# Patient Record
Sex: MALE | Race: WHITE | Employment: OTHER | URBAN - METROPOLITAN AREA
[De-identification: names, ages, dates, MRNs, and addresses within clinical notes are randomized per-mention and may not be internally consistent; named-entity substitution may affect disease eponyms.]

---

## 2017-01-10 ENCOUNTER — OFFICE VISIT - HEALTHEAST (OUTPATIENT)
Dept: INTERNAL MEDICINE | Facility: CLINIC | Age: 62
End: 2017-01-10

## 2017-01-10 DIAGNOSIS — E11.9 DIABETES (H): ICD-10-CM

## 2017-01-10 DIAGNOSIS — Z00.00 ROUTINE GENERAL MEDICAL EXAMINATION AT A HEALTH CARE FACILITY: ICD-10-CM

## 2017-01-10 DIAGNOSIS — N40.0 BPH (BENIGN PROSTATIC HYPERPLASIA): ICD-10-CM

## 2017-01-10 DIAGNOSIS — I10 ESSENTIAL HYPERTENSION: ICD-10-CM

## 2017-01-10 DIAGNOSIS — I10 UNSPECIFIED ESSENTIAL HYPERTENSION: ICD-10-CM

## 2017-01-10 DIAGNOSIS — E78.5 HYPERLIPIDEMIA: ICD-10-CM

## 2017-01-10 DIAGNOSIS — Z23 NEED FOR SHINGLES VACCINE: ICD-10-CM

## 2017-01-10 DIAGNOSIS — F52.8 PSYCHOSEXUAL DYSFUNCTION WITH INHIBITED SEXUAL EXCITEMENT: ICD-10-CM

## 2017-01-10 DIAGNOSIS — D12.6 BENIGN NEOPLASM OF COLON: ICD-10-CM

## 2017-01-10 LAB
CHOLEST SERPL-MCNC: 249 MG/DL
FASTING STATUS PATIENT QL REPORTED: YES
HDLC SERPL-MCNC: 30 MG/DL
LDLC SERPL CALC-MCNC: 187 MG/DL
TRIGL SERPL-MCNC: 160 MG/DL

## 2017-01-10 ASSESSMENT — MIFFLIN-ST. JEOR: SCORE: 1592.63

## 2017-01-11 ENCOUNTER — AMBULATORY - HEALTHEAST (OUTPATIENT)
Dept: INTERNAL MEDICINE | Facility: CLINIC | Age: 62
End: 2017-01-11

## 2017-01-11 ENCOUNTER — COMMUNICATION - HEALTHEAST (OUTPATIENT)
Dept: INTERNAL MEDICINE | Facility: CLINIC | Age: 62
End: 2017-01-11

## 2017-01-11 DIAGNOSIS — E11.9 DIABETES (H): ICD-10-CM

## 2017-01-11 LAB — HBA1C MFR BLD: 7.1 % (ref 3.5–6)

## 2017-02-28 ENCOUNTER — COMMUNICATION - HEALTHEAST (OUTPATIENT)
Dept: INTERNAL MEDICINE | Facility: CLINIC | Age: 62
End: 2017-02-28

## 2017-04-21 ENCOUNTER — RECORDS - HEALTHEAST (OUTPATIENT)
Dept: ADMINISTRATIVE | Facility: OTHER | Age: 62
End: 2017-04-21

## 2017-05-24 ENCOUNTER — COMMUNICATION - HEALTHEAST (OUTPATIENT)
Dept: INTERNAL MEDICINE | Facility: CLINIC | Age: 62
End: 2017-05-24

## 2017-11-28 ENCOUNTER — RECORDS - HEALTHEAST (OUTPATIENT)
Dept: ADMINISTRATIVE | Facility: OTHER | Age: 62
End: 2017-11-28

## 2018-01-10 ENCOUNTER — COMMUNICATION - HEALTHEAST (OUTPATIENT)
Dept: INTERNAL MEDICINE | Facility: CLINIC | Age: 63
End: 2018-01-10

## 2018-01-11 ENCOUNTER — OFFICE VISIT - HEALTHEAST (OUTPATIENT)
Dept: INTERNAL MEDICINE | Facility: CLINIC | Age: 63
End: 2018-01-11

## 2018-01-11 DIAGNOSIS — Z23 NEED FOR PROPHYLACTIC VACCINATION WITH TETANUS-DIPHTHERIA (TD): ICD-10-CM

## 2018-01-11 DIAGNOSIS — E78.5 HYPERLIPIDEMIA: ICD-10-CM

## 2018-01-11 DIAGNOSIS — Z00.00 ROUTINE GENERAL MEDICAL EXAMINATION AT A HEALTH CARE FACILITY: ICD-10-CM

## 2018-01-11 DIAGNOSIS — I10 ESSENTIAL HYPERTENSION: ICD-10-CM

## 2018-01-11 DIAGNOSIS — Z23 NEED FOR VACCINATION FOR STREP PNEUMONIAE: ICD-10-CM

## 2018-01-11 DIAGNOSIS — K29.70 GASTRITIS: ICD-10-CM

## 2018-01-11 DIAGNOSIS — R73.03 PREDIABETES: ICD-10-CM

## 2018-01-11 DIAGNOSIS — N40.1 BENIGN PROSTATIC HYPERPLASIA WITH LOWER URINARY TRACT SYMPTOMS, SYMPTOM DETAILS UNSPECIFIED: ICD-10-CM

## 2018-01-11 LAB
ALBUMIN SERPL-MCNC: 3.8 G/DL (ref 3.5–5)
ALP SERPL-CCNC: 74 U/L (ref 45–120)
ALT SERPL W P-5'-P-CCNC: 48 U/L (ref 0–45)
ANION GAP SERPL CALCULATED.3IONS-SCNC: 7 MMOL/L (ref 5–18)
AST SERPL W P-5'-P-CCNC: 32 U/L (ref 0–40)
BILIRUB DIRECT SERPL-MCNC: 0.3 MG/DL
BILIRUB SERPL-MCNC: 0.9 MG/DL (ref 0–1)
BUN SERPL-MCNC: 13 MG/DL (ref 8–22)
CALCIUM SERPL-MCNC: 9 MG/DL (ref 8.5–10.5)
CHLORIDE BLD-SCNC: 108 MMOL/L (ref 98–107)
CHOLEST SERPL-MCNC: 168 MG/DL
CO2 SERPL-SCNC: 24 MMOL/L (ref 22–31)
CREAT SERPL-MCNC: 0.83 MG/DL (ref 0.7–1.3)
CREAT UR-MCNC: 169.6 MG/DL
ERYTHROCYTE [DISTWIDTH] IN BLOOD BY AUTOMATED COUNT: 11.1 % (ref 11–14.5)
FASTING STATUS PATIENT QL REPORTED: YES
GFR SERPL CREATININE-BSD FRML MDRD: >60 ML/MIN/1.73M2
GLUCOSE BLD-MCNC: 174 MG/DL (ref 70–125)
HBA1C MFR BLD: 7.8 % (ref 3.5–6)
HCT VFR BLD AUTO: 48.2 % (ref 40–54)
HDLC SERPL-MCNC: 32 MG/DL
HGB BLD-MCNC: 16.1 G/DL (ref 14–18)
LDLC SERPL CALC-MCNC: 105 MG/DL
MCH RBC QN AUTO: 31.4 PG (ref 27–34)
MCHC RBC AUTO-ENTMCNC: 33.5 G/DL (ref 32–36)
MCV RBC AUTO: 94 FL (ref 80–100)
MICROALBUMIN UR-MCNC: 0.58 MG/DL (ref 0–1.99)
MICROALBUMIN/CREAT UR: 3.4 MG/G
PLATELET # BLD AUTO: 154 THOU/UL (ref 140–440)
PMV BLD AUTO: 8.5 FL (ref 7–10)
POTASSIUM BLD-SCNC: 3.9 MMOL/L (ref 3.5–5)
PROT SERPL-MCNC: 7.4 G/DL (ref 6–8)
PSA SERPL-MCNC: 1.6 NG/ML (ref 0–4.5)
RBC # BLD AUTO: 5.14 MILL/UL (ref 4.4–6.2)
SODIUM SERPL-SCNC: 139 MMOL/L (ref 136–145)
TRIGL SERPL-MCNC: 155 MG/DL
TSH SERPL DL<=0.005 MIU/L-ACNC: 0.99 UIU/ML (ref 0.3–5)
WBC: 5.3 THOU/UL (ref 4–11)

## 2018-01-11 ASSESSMENT — MIFFLIN-ST. JEOR: SCORE: 1602.27

## 2018-01-15 ENCOUNTER — COMMUNICATION - HEALTHEAST (OUTPATIENT)
Dept: INTERNAL MEDICINE | Facility: CLINIC | Age: 63
End: 2018-01-15

## 2018-01-16 ENCOUNTER — AMBULATORY - HEALTHEAST (OUTPATIENT)
Dept: INTERNAL MEDICINE | Facility: CLINIC | Age: 63
End: 2018-01-16

## 2018-01-16 DIAGNOSIS — E66.9 DIABETES MELLITUS TYPE 2 IN OBESE: ICD-10-CM

## 2018-01-16 DIAGNOSIS — E11.69 DIABETES MELLITUS TYPE 2 IN OBESE: ICD-10-CM

## 2018-02-07 ENCOUNTER — RECORDS - HEALTHEAST (OUTPATIENT)
Dept: ADMINISTRATIVE | Facility: OTHER | Age: 63
End: 2018-02-07

## 2018-02-20 ENCOUNTER — COMMUNICATION - HEALTHEAST (OUTPATIENT)
Dept: INTERNAL MEDICINE | Facility: CLINIC | Age: 63
End: 2018-02-20

## 2018-02-20 DIAGNOSIS — I10 ESSENTIAL HYPERTENSION: ICD-10-CM

## 2018-04-17 ENCOUNTER — OFFICE VISIT - HEALTHEAST (OUTPATIENT)
Dept: INTERNAL MEDICINE | Facility: CLINIC | Age: 63
End: 2018-04-17

## 2018-04-17 DIAGNOSIS — I10 ESSENTIAL HYPERTENSION: ICD-10-CM

## 2018-04-17 DIAGNOSIS — E78.5 HYPERLIPIDEMIA: ICD-10-CM

## 2018-04-17 DIAGNOSIS — E11.9 DIABETES MELLITUS, TYPE 2 (H): ICD-10-CM

## 2018-04-17 LAB
ANION GAP SERPL CALCULATED.3IONS-SCNC: 10 MMOL/L (ref 5–18)
BUN SERPL-MCNC: 14 MG/DL (ref 8–22)
CALCIUM SERPL-MCNC: 9.5 MG/DL (ref 8.5–10.5)
CHLORIDE BLD-SCNC: 105 MMOL/L (ref 98–107)
CHOLEST SERPL-MCNC: 225 MG/DL
CO2 SERPL-SCNC: 23 MMOL/L (ref 22–31)
CREAT SERPL-MCNC: 0.8 MG/DL (ref 0.7–1.3)
FASTING STATUS PATIENT QL REPORTED: YES
GFR SERPL CREATININE-BSD FRML MDRD: >60 ML/MIN/1.73M2
GLUCOSE BLD-MCNC: 116 MG/DL (ref 70–125)
HBA1C MFR BLD: 6.6 % (ref 3.5–6)
HDLC SERPL-MCNC: 31 MG/DL
LDLC SERPL CALC-MCNC: 156 MG/DL
POTASSIUM BLD-SCNC: 4.1 MMOL/L (ref 3.5–5)
SODIUM SERPL-SCNC: 138 MMOL/L (ref 136–145)
TRIGL SERPL-MCNC: 189 MG/DL

## 2018-04-17 ASSESSMENT — MIFFLIN-ST. JEOR: SCORE: 1565.98

## 2018-04-19 ENCOUNTER — COMMUNICATION - HEALTHEAST (OUTPATIENT)
Dept: INTERNAL MEDICINE | Facility: CLINIC | Age: 63
End: 2018-04-19

## 2018-06-04 ENCOUNTER — OFFICE VISIT - HEALTHEAST (OUTPATIENT)
Dept: INTERNAL MEDICINE | Facility: CLINIC | Age: 63
End: 2018-06-04

## 2018-06-04 DIAGNOSIS — Z01.818 PREOP EXAM FOR INTERNAL MEDICINE: ICD-10-CM

## 2018-06-04 DIAGNOSIS — I10 ESSENTIAL HYPERTENSION: ICD-10-CM

## 2018-06-04 DIAGNOSIS — E66.9 DIABETES MELLITUS TYPE 2 IN OBESE: ICD-10-CM

## 2018-06-04 DIAGNOSIS — E78.5 HYPERLIPIDEMIA: ICD-10-CM

## 2018-06-04 DIAGNOSIS — N40.0 BPH (BENIGN PROSTATIC HYPERPLASIA): ICD-10-CM

## 2018-06-04 DIAGNOSIS — E11.69 DIABETES MELLITUS TYPE 2 IN OBESE: ICD-10-CM

## 2018-06-04 LAB
ANION GAP SERPL CALCULATED.3IONS-SCNC: 11 MMOL/L (ref 5–18)
ATRIAL RATE - MUSE: 73 BPM
BUN SERPL-MCNC: 13 MG/DL (ref 8–22)
CALCIUM SERPL-MCNC: 10.3 MG/DL (ref 8.5–10.5)
CHLORIDE BLD-SCNC: 106 MMOL/L (ref 98–107)
CHOLEST SERPL-MCNC: 243 MG/DL
CO2 SERPL-SCNC: 23 MMOL/L (ref 22–31)
CREAT SERPL-MCNC: 0.9 MG/DL (ref 0.7–1.3)
DIASTOLIC BLOOD PRESSURE - MUSE: NORMAL MMHG
ERYTHROCYTE [DISTWIDTH] IN BLOOD BY AUTOMATED COUNT: 11.4 % (ref 11–14.5)
FASTING STATUS PATIENT QL REPORTED: ABNORMAL
GFR SERPL CREATININE-BSD FRML MDRD: >60 ML/MIN/1.73M2
GLUCOSE BLD-MCNC: 117 MG/DL (ref 70–125)
HBA1C MFR BLD: 6.4 % (ref 3.5–6)
HCT VFR BLD AUTO: 50.3 % (ref 40–54)
HDLC SERPL-MCNC: 34 MG/DL
HGB BLD-MCNC: 17 G/DL (ref 14–18)
INTERPRETATION ECG - MUSE: NORMAL
LDLC SERPL CALC-MCNC: 164 MG/DL
MCH RBC QN AUTO: 31.9 PG (ref 27–34)
MCHC RBC AUTO-ENTMCNC: 33.8 G/DL (ref 32–36)
MCV RBC AUTO: 94 FL (ref 80–100)
P AXIS - MUSE: 27 DEGREES
PLATELET # BLD AUTO: 197 THOU/UL (ref 140–440)
PMV BLD AUTO: 8.3 FL (ref 7–10)
POTASSIUM BLD-SCNC: 4.2 MMOL/L (ref 3.5–5)
PR INTERVAL - MUSE: 186 MS
QRS DURATION - MUSE: 104 MS
QT - MUSE: 392 MS
QTC - MUSE: 431 MS
R AXIS - MUSE: -29 DEGREES
RBC # BLD AUTO: 5.33 MILL/UL (ref 4.4–6.2)
SODIUM SERPL-SCNC: 140 MMOL/L (ref 136–145)
SYSTOLIC BLOOD PRESSURE - MUSE: NORMAL MMHG
T AXIS - MUSE: 13 DEGREES
TRIGL SERPL-MCNC: 226 MG/DL
VENTRICULAR RATE- MUSE: 73 BPM
WBC: 6.9 THOU/UL (ref 4–11)

## 2018-06-04 ASSESSMENT — MIFFLIN-ST. JEOR: SCORE: 1507.01

## 2018-06-05 ENCOUNTER — COMMUNICATION - HEALTHEAST (OUTPATIENT)
Dept: INTERNAL MEDICINE | Facility: CLINIC | Age: 63
End: 2018-06-05

## 2018-06-25 ASSESSMENT — MIFFLIN-ST. JEOR: SCORE: 1534.23

## 2018-06-28 ENCOUNTER — ANESTHESIA - HEALTHEAST (OUTPATIENT)
Dept: SURGERY | Facility: CLINIC | Age: 63
End: 2018-06-28

## 2018-06-28 ENCOUNTER — SURGERY - HEALTHEAST (OUTPATIENT)
Dept: SURGERY | Facility: CLINIC | Age: 63
End: 2018-06-28

## 2018-08-07 ENCOUNTER — RECORDS - HEALTHEAST (OUTPATIENT)
Dept: ADMINISTRATIVE | Facility: OTHER | Age: 63
End: 2018-08-07

## 2018-09-18 ENCOUNTER — OFFICE VISIT - HEALTHEAST (OUTPATIENT)
Dept: INTERNAL MEDICINE | Facility: CLINIC | Age: 63
End: 2018-09-18

## 2018-09-18 DIAGNOSIS — E78.5 HYPERLIPIDEMIA: ICD-10-CM

## 2018-09-18 DIAGNOSIS — I10 ESSENTIAL HYPERTENSION: ICD-10-CM

## 2018-09-18 DIAGNOSIS — E11.9 DIABETES MELLITUS, TYPE 2 (H): ICD-10-CM

## 2018-09-18 DIAGNOSIS — N40.1 BENIGN PROSTATIC HYPERPLASIA WITH LOWER URINARY TRACT SYMPTOMS, SYMPTOM DETAILS UNSPECIFIED: ICD-10-CM

## 2018-09-18 LAB
ALBUMIN SERPL-MCNC: 4.2 G/DL (ref 3.5–5)
ALP SERPL-CCNC: 61 U/L (ref 45–120)
ALT SERPL W P-5'-P-CCNC: 25 U/L (ref 0–45)
ANION GAP SERPL CALCULATED.3IONS-SCNC: 10 MMOL/L (ref 5–18)
AST SERPL W P-5'-P-CCNC: 18 U/L (ref 0–40)
BILIRUB DIRECT SERPL-MCNC: 0.3 MG/DL
BILIRUB SERPL-MCNC: 0.9 MG/DL (ref 0–1)
BUN SERPL-MCNC: 11 MG/DL (ref 8–22)
CALCIUM SERPL-MCNC: 9.8 MG/DL (ref 8.5–10.5)
CHLORIDE BLD-SCNC: 106 MMOL/L (ref 98–107)
CHOLEST SERPL-MCNC: 154 MG/DL
CO2 SERPL-SCNC: 24 MMOL/L (ref 22–31)
CREAT SERPL-MCNC: 0.8 MG/DL (ref 0.7–1.3)
ERYTHROCYTE [DISTWIDTH] IN BLOOD BY AUTOMATED COUNT: 11.5 % (ref 11–14.5)
FASTING STATUS PATIENT QL REPORTED: YES
GFR SERPL CREATININE-BSD FRML MDRD: >60 ML/MIN/1.73M2
GLUCOSE BLD-MCNC: 126 MG/DL (ref 70–125)
HBA1C MFR BLD: 6.3 % (ref 3.5–6)
HCT VFR BLD AUTO: 48.7 % (ref 40–54)
HDLC SERPL-MCNC: 32 MG/DL
HGB BLD-MCNC: 16.4 G/DL (ref 14–18)
LDLC SERPL CALC-MCNC: 90 MG/DL
MCH RBC QN AUTO: 31.1 PG (ref 27–34)
MCHC RBC AUTO-ENTMCNC: 33.8 G/DL (ref 32–36)
MCV RBC AUTO: 92 FL (ref 80–100)
PLATELET # BLD AUTO: 201 THOU/UL (ref 140–440)
PMV BLD AUTO: 8.8 FL (ref 7–10)
POTASSIUM BLD-SCNC: 4.4 MMOL/L (ref 3.5–5)
PROT SERPL-MCNC: 8 G/DL (ref 6–8)
RBC # BLD AUTO: 5.29 MILL/UL (ref 4.4–6.2)
SODIUM SERPL-SCNC: 140 MMOL/L (ref 136–145)
TRIGL SERPL-MCNC: 161 MG/DL
WBC: 6.1 THOU/UL (ref 4–11)

## 2018-09-18 ASSESSMENT — MIFFLIN-ST. JEOR: SCORE: 1529.69

## 2018-09-19 ENCOUNTER — COMMUNICATION - HEALTHEAST (OUTPATIENT)
Dept: INTERNAL MEDICINE | Facility: CLINIC | Age: 63
End: 2018-09-19

## 2019-01-08 ENCOUNTER — COMMUNICATION - HEALTHEAST (OUTPATIENT)
Dept: INTERNAL MEDICINE | Facility: CLINIC | Age: 64
End: 2019-01-08

## 2019-01-08 DIAGNOSIS — I10 ESSENTIAL HYPERTENSION: ICD-10-CM

## 2019-01-08 DIAGNOSIS — E78.5 HYPERLIPIDEMIA: ICD-10-CM

## 2019-01-08 DIAGNOSIS — E11.9 DIABETES MELLITUS, TYPE 2 (H): ICD-10-CM

## 2019-04-17 ENCOUNTER — OFFICE VISIT - HEALTHEAST (OUTPATIENT)
Dept: INTERNAL MEDICINE | Facility: CLINIC | Age: 64
End: 2019-04-17

## 2019-04-17 DIAGNOSIS — F52.8 PSYCHOSEXUAL DYSFUNCTION WITH INHIBITED SEXUAL EXCITEMENT: ICD-10-CM

## 2019-04-17 DIAGNOSIS — Z00.00 ROUTINE GENERAL MEDICAL EXAMINATION AT A HEALTH CARE FACILITY: ICD-10-CM

## 2019-04-17 DIAGNOSIS — N40.0 BENIGN PROSTATIC HYPERPLASIA WITHOUT LOWER URINARY TRACT SYMPTOMS: ICD-10-CM

## 2019-04-17 DIAGNOSIS — Z23 NEED FOR PROPHYLACTIC VACCINATION AGAINST STREPTOCOCCUS PNEUMONIAE (PNEUMOCOCCUS) AND INFLUENZA: ICD-10-CM

## 2019-04-17 DIAGNOSIS — E78.2 MIXED HYPERLIPIDEMIA: ICD-10-CM

## 2019-04-17 DIAGNOSIS — E11.9 TYPE 2 DIABETES MELLITUS WITHOUT COMPLICATION, WITHOUT LONG-TERM CURRENT USE OF INSULIN (H): ICD-10-CM

## 2019-04-17 DIAGNOSIS — I10 ESSENTIAL HYPERTENSION: ICD-10-CM

## 2019-04-17 LAB
ALBUMIN SERPL-MCNC: 4.2 G/DL (ref 3.5–5)
ALBUMIN UR-MCNC: NEGATIVE MG/DL
ALP SERPL-CCNC: 63 U/L (ref 45–120)
ALT SERPL W P-5'-P-CCNC: 31 U/L (ref 0–45)
ANION GAP SERPL CALCULATED.3IONS-SCNC: 8 MMOL/L (ref 5–18)
APPEARANCE UR: CLEAR
AST SERPL W P-5'-P-CCNC: 23 U/L (ref 0–40)
BILIRUB DIRECT SERPL-MCNC: 0.2 MG/DL
BILIRUB SERPL-MCNC: 0.7 MG/DL (ref 0–1)
BILIRUB UR QL STRIP: NEGATIVE
BUN SERPL-MCNC: 13 MG/DL (ref 8–22)
CALCIUM SERPL-MCNC: 10 MG/DL (ref 8.5–10.5)
CHLORIDE BLD-SCNC: 106 MMOL/L (ref 98–107)
CHOLEST SERPL-MCNC: 167 MG/DL
CO2 SERPL-SCNC: 25 MMOL/L (ref 22–31)
COLOR UR AUTO: YELLOW
CREAT SERPL-MCNC: 0.84 MG/DL (ref 0.7–1.3)
CREAT UR-MCNC: 137.8 MG/DL
ERYTHROCYTE [DISTWIDTH] IN BLOOD BY AUTOMATED COUNT: 11.8 % (ref 11–14.5)
FASTING STATUS PATIENT QL REPORTED: YES
GFR SERPL CREATININE-BSD FRML MDRD: >60 ML/MIN/1.73M2
GLUCOSE BLD-MCNC: 121 MG/DL (ref 70–125)
GLUCOSE UR STRIP-MCNC: NEGATIVE MG/DL
HBA1C MFR BLD: 6.4 % (ref 3.5–6)
HCT VFR BLD AUTO: 48.8 % (ref 40–54)
HDLC SERPL-MCNC: 34 MG/DL
HGB BLD-MCNC: 17 G/DL (ref 14–18)
HGB UR QL STRIP: NEGATIVE
KETONES UR STRIP-MCNC: NEGATIVE MG/DL
LDLC SERPL CALC-MCNC: 88 MG/DL
LEUKOCYTE ESTERASE UR QL STRIP: NEGATIVE
MCH RBC QN AUTO: 31.6 PG (ref 27–34)
MCHC RBC AUTO-ENTMCNC: 34.8 G/DL (ref 32–36)
MCV RBC AUTO: 91 FL (ref 80–100)
MICROALBUMIN UR-MCNC: 1.44 MG/DL (ref 0–1.99)
MICROALBUMIN/CREAT UR: 10.4 MG/G
NITRATE UR QL: NEGATIVE
PH UR STRIP: 5.5 [PH] (ref 5–8)
PLATELET # BLD AUTO: 183 THOU/UL (ref 140–440)
PMV BLD AUTO: 8.3 FL (ref 7–10)
POTASSIUM BLD-SCNC: 4.2 MMOL/L (ref 3.5–5)
PROT SERPL-MCNC: 7.8 G/DL (ref 6–8)
RBC # BLD AUTO: 5.39 MILL/UL (ref 4.4–6.2)
SODIUM SERPL-SCNC: 139 MMOL/L (ref 136–145)
SP GR UR STRIP: 1.02 (ref 1–1.03)
TRIGL SERPL-MCNC: 226 MG/DL
TSH SERPL DL<=0.005 MIU/L-ACNC: 0.68 UIU/ML (ref 0.3–5)
UROBILINOGEN UR STRIP-ACNC: NORMAL
WBC: 6.3 THOU/UL (ref 4–11)

## 2019-04-17 ASSESSMENT — MIFFLIN-ST. JEOR: SCORE: 1561.44

## 2019-04-22 ENCOUNTER — COMMUNICATION - HEALTHEAST (OUTPATIENT)
Dept: INTERNAL MEDICINE | Facility: CLINIC | Age: 64
End: 2019-04-22

## 2019-05-17 ENCOUNTER — RECORDS - HEALTHEAST (OUTPATIENT)
Dept: ADMINISTRATIVE | Facility: OTHER | Age: 64
End: 2019-05-17

## 2019-05-24 ENCOUNTER — RECORDS - HEALTHEAST (OUTPATIENT)
Dept: HEALTH INFORMATION MANAGEMENT | Facility: CLINIC | Age: 64
End: 2019-05-24

## 2019-11-06 ENCOUNTER — AMBULATORY - HEALTHEAST (OUTPATIENT)
Dept: INTERNAL MEDICINE | Facility: CLINIC | Age: 64
End: 2019-11-06

## 2019-11-06 ENCOUNTER — COMMUNICATION - HEALTHEAST (OUTPATIENT)
Dept: LAB | Facility: CLINIC | Age: 64
End: 2019-11-06

## 2019-11-06 DIAGNOSIS — E78.2 MIXED HYPERLIPIDEMIA: ICD-10-CM

## 2019-11-06 DIAGNOSIS — E11.9 TYPE 2 DIABETES MELLITUS WITHOUT COMPLICATION, WITHOUT LONG-TERM CURRENT USE OF INSULIN (H): ICD-10-CM

## 2019-11-26 ENCOUNTER — AMBULATORY - HEALTHEAST (OUTPATIENT)
Dept: LAB | Facility: CLINIC | Age: 64
End: 2019-11-26

## 2019-11-26 DIAGNOSIS — E11.9 TYPE 2 DIABETES MELLITUS WITHOUT COMPLICATION, WITHOUT LONG-TERM CURRENT USE OF INSULIN (H): ICD-10-CM

## 2019-11-26 DIAGNOSIS — E78.2 MIXED HYPERLIPIDEMIA: ICD-10-CM

## 2019-11-26 LAB
ALBUMIN SERPL-MCNC: 4.2 G/DL (ref 3.5–5)
ALP SERPL-CCNC: 55 U/L (ref 45–120)
ALT SERPL W P-5'-P-CCNC: 36 U/L (ref 0–45)
ANION GAP SERPL CALCULATED.3IONS-SCNC: 13 MMOL/L (ref 5–18)
AST SERPL W P-5'-P-CCNC: 22 U/L (ref 0–40)
BILIRUB DIRECT SERPL-MCNC: 0.3 MG/DL
BILIRUB SERPL-MCNC: 0.9 MG/DL (ref 0–1)
BUN SERPL-MCNC: 15 MG/DL (ref 8–22)
CALCIUM SERPL-MCNC: 9.6 MG/DL (ref 8.5–10.5)
CHLORIDE BLD-SCNC: 108 MMOL/L (ref 98–107)
CHOLEST SERPL-MCNC: 156 MG/DL
CO2 SERPL-SCNC: 21 MMOL/L (ref 22–31)
CREAT SERPL-MCNC: 0.89 MG/DL (ref 0.7–1.3)
FASTING STATUS PATIENT QL REPORTED: YES
GFR SERPL CREATININE-BSD FRML MDRD: >60 ML/MIN/1.73M2
GLUCOSE BLD-MCNC: 149 MG/DL (ref 70–125)
HBA1C MFR BLD: 6.9 % (ref 3.5–6)
HDLC SERPL-MCNC: 35 MG/DL
LDLC SERPL CALC-MCNC: 83 MG/DL
POTASSIUM BLD-SCNC: 4.5 MMOL/L (ref 3.5–5)
PROT SERPL-MCNC: 7.6 G/DL (ref 6–8)
SODIUM SERPL-SCNC: 142 MMOL/L (ref 136–145)
TRIGL SERPL-MCNC: 189 MG/DL

## 2019-11-27 ENCOUNTER — COMMUNICATION - HEALTHEAST (OUTPATIENT)
Dept: INTERNAL MEDICINE | Facility: CLINIC | Age: 64
End: 2019-11-27

## 2019-12-16 ENCOUNTER — RECORDS - HEALTHEAST (OUTPATIENT)
Dept: ADMINISTRATIVE | Facility: OTHER | Age: 64
End: 2019-12-16

## 2020-03-22 ENCOUNTER — COMMUNICATION - HEALTHEAST (OUTPATIENT)
Dept: INTERNAL MEDICINE | Facility: CLINIC | Age: 65
End: 2020-03-22

## 2020-03-22 DIAGNOSIS — E11.9 TYPE 2 DIABETES MELLITUS WITHOUT COMPLICATION, WITHOUT LONG-TERM CURRENT USE OF INSULIN (H): ICD-10-CM

## 2020-03-22 DIAGNOSIS — E78.2 MIXED HYPERLIPIDEMIA: ICD-10-CM

## 2020-03-22 DIAGNOSIS — I10 ESSENTIAL HYPERTENSION: ICD-10-CM

## 2020-07-10 ENCOUNTER — RECORDS - HEALTHEAST (OUTPATIENT)
Dept: ADMINISTRATIVE | Facility: OTHER | Age: 65
End: 2020-07-10

## 2020-07-10 LAB — RETINOPATHY: NEGATIVE

## 2020-07-16 ENCOUNTER — RECORDS - HEALTHEAST (OUTPATIENT)
Dept: HEALTH INFORMATION MANAGEMENT | Facility: CLINIC | Age: 65
End: 2020-07-16

## 2020-10-08 ENCOUNTER — TELEPHONE (OUTPATIENT)
Dept: INTERNAL MEDICINE | Facility: CLINIC | Age: 65
End: 2020-10-08

## 2020-10-08 DIAGNOSIS — Z00.00 ROUTINE GENERAL MEDICAL EXAMINATION AT A HEALTH CARE FACILITY: Primary | ICD-10-CM

## 2020-10-08 NOTE — TELEPHONE ENCOUNTER
Patient is calling to get lab orders placed. He would like a hgb a1c, CMP, CBC, Vitamin D, albumin, and a lipid panel please.

## 2020-10-19 ENCOUNTER — OFFICE VISIT (OUTPATIENT)
Dept: INTERNAL MEDICINE | Facility: CLINIC | Age: 65
End: 2020-10-19
Payer: COMMERCIAL

## 2020-10-19 VITALS
RESPIRATION RATE: 16 BRPM | BODY MASS INDEX: 31.92 KG/M2 | OXYGEN SATURATION: 99 % | HEIGHT: 64 IN | SYSTOLIC BLOOD PRESSURE: 111 MMHG | HEART RATE: 68 BPM | WEIGHT: 187 LBS | DIASTOLIC BLOOD PRESSURE: 75 MMHG

## 2020-10-19 DIAGNOSIS — R10.9 LEFT FLANK PAIN: Primary | ICD-10-CM

## 2020-10-19 DIAGNOSIS — R09.89 CAROTID BRUIT, UNSPECIFIED LATERALITY: ICD-10-CM

## 2020-10-19 DIAGNOSIS — D12.6 ADENOMATOUS POLYP OF COLON, UNSPECIFIED PART OF COLON: ICD-10-CM

## 2020-10-19 DIAGNOSIS — E78.5 HYPERLIPIDEMIA LDL GOAL <100: ICD-10-CM

## 2020-10-19 DIAGNOSIS — E11.9 TYPE 2 DIABETES MELLITUS WITHOUT COMPLICATION, WITHOUT LONG-TERM CURRENT USE OF INSULIN (H): ICD-10-CM

## 2020-10-19 DIAGNOSIS — Z23 NEED FOR VACCINATION: ICD-10-CM

## 2020-10-19 DIAGNOSIS — I10 HTN, GOAL BELOW 140/90: ICD-10-CM

## 2020-10-19 DIAGNOSIS — R06.02 SOB (SHORTNESS OF BREATH) ON EXERTION: ICD-10-CM

## 2020-10-19 LAB
ERYTHROCYTE [DISTWIDTH] IN BLOOD BY AUTOMATED COUNT: 13 % (ref 10–15)
HBA1C MFR BLD: 6.4 % (ref 0–5.6)
HCT VFR BLD AUTO: 46 % (ref 40–53)
HGB BLD-MCNC: 15.6 G/DL (ref 13.3–17.7)
MCH RBC QN AUTO: 31.3 PG (ref 26.5–33)
MCHC RBC AUTO-ENTMCNC: 33.9 G/DL (ref 31.5–36.5)
MCV RBC AUTO: 92 FL (ref 78–100)
PLATELET # BLD AUTO: 173 10E9/L (ref 150–450)
RBC # BLD AUTO: 4.99 10E12/L (ref 4.4–5.9)
WBC # BLD AUTO: 5.7 10E9/L (ref 4–11)

## 2020-10-19 PROCEDURE — 82043 UR ALBUMIN QUANTITATIVE: CPT | Performed by: INTERNAL MEDICINE

## 2020-10-19 PROCEDURE — 90471 IMMUNIZATION ADMIN: CPT | Performed by: INTERNAL MEDICINE

## 2020-10-19 PROCEDURE — 80061 LIPID PANEL: CPT | Performed by: INTERNAL MEDICINE

## 2020-10-19 PROCEDURE — 83036 HEMOGLOBIN GLYCOSYLATED A1C: CPT | Performed by: INTERNAL MEDICINE

## 2020-10-19 PROCEDURE — 36415 COLL VENOUS BLD VENIPUNCTURE: CPT | Performed by: INTERNAL MEDICINE

## 2020-10-19 PROCEDURE — 82306 VITAMIN D 25 HYDROXY: CPT | Performed by: INTERNAL MEDICINE

## 2020-10-19 PROCEDURE — 99207 PR FOOT EXAM NO CHARGE: CPT | Performed by: INTERNAL MEDICINE

## 2020-10-19 PROCEDURE — 85027 COMPLETE CBC AUTOMATED: CPT | Performed by: INTERNAL MEDICINE

## 2020-10-19 PROCEDURE — 80053 COMPREHEN METABOLIC PANEL: CPT | Performed by: INTERNAL MEDICINE

## 2020-10-19 PROCEDURE — 90750 HZV VACC RECOMBINANT IM: CPT | Performed by: INTERNAL MEDICINE

## 2020-10-19 PROCEDURE — 84443 ASSAY THYROID STIM HORMONE: CPT | Performed by: INTERNAL MEDICINE

## 2020-10-19 PROCEDURE — 99204 OFFICE O/P NEW MOD 45 MIN: CPT | Mod: 25 | Performed by: INTERNAL MEDICINE

## 2020-10-19 RX ORDER — SIMVASTATIN 20 MG
TABLET ORAL
COMMUNITY
Start: 2020-03-23 | End: 2020-10-19

## 2020-10-19 RX ORDER — SIMVASTATIN 20 MG
20 TABLET ORAL AT BEDTIME
Qty: 90 TABLET | Refills: 4 | Status: SHIPPED | OUTPATIENT
Start: 2020-10-19 | End: 2021-03-24

## 2020-10-19 RX ORDER — LISINOPRIL 5 MG/1
5 TABLET ORAL DAILY
Qty: 90 TABLET | Refills: 4 | Status: SHIPPED | OUTPATIENT
Start: 2020-10-19 | End: 2021-03-24

## 2020-10-19 RX ORDER — LISINOPRIL 5 MG/1
TABLET ORAL
COMMUNITY
Start: 2020-03-23 | End: 2020-10-19

## 2020-10-19 ASSESSMENT — ENCOUNTER SYMPTOMS
DIARRHEA: 0
WEAKNESS: 0
SORE THROAT: 0
HEMATURIA: 0
PALPITATIONS: 0
JOINT SWELLING: 0
ARTHRALGIAS: 0
CONSTIPATION: 0
DYSURIA: 0
PARESTHESIAS: 0
ABDOMINAL PAIN: 1
FREQUENCY: 0
HEADACHES: 0
EYE PAIN: 0
COUGH: 0
FEVER: 0
NERVOUS/ANXIOUS: 0
CHILLS: 0
DIZZINESS: 0
NAUSEA: 0
MYALGIAS: 0
SHORTNESS OF BREATH: 0
HEMATOCHEZIA: 0
HEARTBURN: 0

## 2020-10-19 ASSESSMENT — MIFFLIN-ST. JEOR: SCORE: 1539.23

## 2020-10-19 ASSESSMENT — ACTIVITIES OF DAILY LIVING (ADL): CURRENT_FUNCTION: NO ASSISTANCE NEEDED

## 2020-10-19 NOTE — PROGRESS NOTES
Patient's instructions / PLAN:                                                        Plan:  1.  Labs today - [address]   2. Abdomen CT -  To schedule this test you may call Scheduling center at [phone]    3. Carotid US - To schedule this test you may call Scheduling center at [phone]    4. Stress echo - To schedule this test please call [facility] at: [phone]         ASSESSMENT & PLAN:                                                      (R10.9) Left flank pain  (primary encounter diagnosis)  Comment:   Plan: CT Abdomen Pelvis w/o Contrast, CANCELED: CT         Abdomen w/o Contrast            (R06.02) SOB (shortness of breath) on exertion  Comment:   Plan: Echo Stress Test with Definity            (R09.89) Carotid bruit, unspecified laterality  Comment:   Plan: US Carotid Bilateral            (D12.6) Adenomatous polyp of colon, needs colonoscopy 2024  Comment:   Plan:     (E11.9) DM 2 (H)  Comment:   Plan: lisinopril (ZESTRIL) 5 MG tablet, metFORMIN         (GLUCOPHAGE) 1000 MG tablet            (E78.5) Hyperlipidemia LDL goal <100  Comment:   Plan: simvastatin (ZOCOR) 20 MG tablet            (I10) HTN, goal below 140/90  Comment:   Plan: lisinopril (ZESTRIL) 5 MG tablet            (Z23) Need for vaccination  Comment:   Plan: SHINGRIX [64492], 1st  Administration  [02258]               Chief Complaint:                                                      Follow up chronic medical problems        SUBJECTIVE:                                                    History of present illness     Hx of kidney stones    L flank pain  -- on/off   -- hx kidney stone     Exertional SOB  -- x few months  -- non smoker  -- stress echo 10 y ago - normal     Patient reports normal colonoscopy Dec 2019  Hx of adenom colon polyp     Patient reports neg Hep C screening - done through his employer - [employer]     ROS:                                                      ROS: negative for fever, chills, cough,  "wheezes, chest pain,  vomiting, abdominal pain, leg swelling Pos for sob, as above   A 10-point review of systems was obtained.  Those pertinent are above and in the in the Subjective section.  The rest of the systems are negative.        OBJECTIVE:                                                    Physical Exam :    Blood pressure 111/75, pulse 68, resp. rate 16, height 1.626 m (5' 4\"), weight 84.8 kg (187 lb), SpO2 99 %.   NAD, appears comfortable  Skin: no rashes   Neck: supple, no JVD, No thyroidmegaly. Lymph nodes nonpalpable cervical and supraclavicular. R Carotid bruit   Chest: clear to auscultation bilaterally, good respiratory effort  Heart: S1 S2, RRR, no mgr appreciated  Abdomen: soft, not tender, no hepatosplenomegaly or masses appreciated, no abdominal bruit, present bowel sounds  Extremities: no edema, clubbing, cyanosis. Palpable pedal pulses bilaterally, Monofilament skin sensation is intact, no feet skin lesions   Neurologic: A, Ox3, no focal signs appreciated    PMHx: reviewed  Past Medical History:   Diagnosis Date     Diabetes mellitus, type 2 (H)      Hyperlipidemia      Hypertension       PSHx: reviewed  No past surgical history on file.     Meds: reviewed  Current Outpatient Medications   Medication Sig Dispense Refill     lisinopril (ZESTRIL) 5 MG tablet TAKE ONE TABLET BY MOUTH ONE TIME DAILY       metFORMIN (GLUCOPHAGE) 1000 MG tablet Take 1,000 mg by mouth 2 times daily (with meals)       simvastatin (ZOCOR) 20 MG tablet TAKE 1 TABLET BY MOUTH AT BEDTIME         Soc Hx: reviewed  Fam Hx: reviewed          Fabiana Bangura MD  Internal Medicine       Answers for HPI/ROS submitted by the patient on 10/19/2020   Annual Exam:  In general, how would you rate your overall physical health?: good  Frequency of exercise:: None  Do you usually eat at least 4 servings of fruit and vegetables a day, include whole grains & fiber, and avoid regularly eating high fat or \"junk\" foods? : No  Taking " medications regularly:: Yes  Medication side effects:: None  Activities of Daily Living: no assistance needed  Home safety: no safety concerns identified  Hearing Impairment:: difficulty following a conversation in a noisy restaurant or crowded room  In the past 6 months, have you been bothered by leaking of urine?: No  abdominal pain: Yes  Blood in stool: No  Blood in urine: No  chest pain: No  chills: No  congestion: No  constipation: No  cough: No  diarrhea: No  dizziness: No  ear pain: No  eye pain: No  nervous/anxious: No  fever: No  frequency: No  genital sores: No  headaches: No  hearing loss: No  heartburn: No  arthralgias: No  joint swelling: No  peripheral edema: No  mood changes: No  myalgias: No  nausea: No  dysuria: No  palpitations: No  Skin sensation changes: No  sore throat: No  urgency: No  rash: No  shortness of breath: No  visual disturbance: No  weakness: No  impotence: Yes  penile discharge: No  In general, how would you rate your overall mental or emotional health?: good  Additional concerns today:: No

## 2020-10-19 NOTE — PATIENT INSTRUCTIONS
Plan:  1.  Labs today - [address]   2. Abdomen CT -  To schedule this test you may call Scheduling center at [phone]    3. Carotid US - To schedule this test you may call Scheduling center at [phone]    4. Stress echo - To schedule this test please call [facility] at: [phone]

## 2020-10-20 LAB
ALBUMIN SERPL-MCNC: 4.1 G/DL (ref 3.4–5)
ALP SERPL-CCNC: 56 U/L (ref 40–150)
ALT SERPL W P-5'-P-CCNC: 43 U/L (ref 0–70)
ANION GAP SERPL CALCULATED.3IONS-SCNC: 6 MMOL/L (ref 3–14)
AST SERPL W P-5'-P-CCNC: 31 U/L (ref 0–45)
BILIRUB SERPL-MCNC: 0.7 MG/DL (ref 0.2–1.3)
BUN SERPL-MCNC: 12 MG/DL (ref 7–30)
CALCIUM SERPL-MCNC: 9.5 MG/DL (ref 8.5–10.1)
CHLORIDE SERPL-SCNC: 108 MMOL/L (ref 94–109)
CHOLEST SERPL-MCNC: 147 MG/DL
CO2 SERPL-SCNC: 23 MMOL/L (ref 20–32)
CREAT SERPL-MCNC: 0.79 MG/DL (ref 0.66–1.25)
CREAT UR-MCNC: 141 MG/DL
DEPRECATED CALCIDIOL+CALCIFEROL SERPL-MC: 46 UG/L (ref 20–75)
GFR SERPL CREATININE-BSD FRML MDRD: >90 ML/MIN/{1.73_M2}
GLUCOSE SERPL-MCNC: 115 MG/DL (ref 70–99)
HDLC SERPL-MCNC: 35 MG/DL
LDLC SERPL CALC-MCNC: 76 MG/DL
MICROALBUMIN UR-MCNC: 21 MG/L
MICROALBUMIN/CREAT UR: 14.89 MG/G CR (ref 0–17)
NONHDLC SERPL-MCNC: 112 MG/DL
POTASSIUM SERPL-SCNC: 4.2 MMOL/L (ref 3.4–5.3)
PROT SERPL-MCNC: 7.9 G/DL (ref 6.8–8.8)
SODIUM SERPL-SCNC: 137 MMOL/L (ref 133–144)
TRIGL SERPL-MCNC: 180 MG/DL
TSH SERPL DL<=0.005 MIU/L-ACNC: 0.72 MU/L (ref 0.4–4)

## 2020-10-27 ENCOUNTER — HOSPITAL ENCOUNTER (OUTPATIENT)
Dept: ULTRASOUND IMAGING | Facility: CLINIC | Age: 65
End: 2020-10-27
Attending: INTERNAL MEDICINE
Payer: COMMERCIAL

## 2020-10-27 ENCOUNTER — HOSPITAL ENCOUNTER (OUTPATIENT)
Dept: CT IMAGING | Facility: CLINIC | Age: 65
End: 2020-10-27
Attending: INTERNAL MEDICINE
Payer: COMMERCIAL

## 2020-10-27 DIAGNOSIS — R10.9 LEFT FLANK PAIN: ICD-10-CM

## 2020-10-27 DIAGNOSIS — R09.89 CAROTID BRUIT, UNSPECIFIED LATERALITY: ICD-10-CM

## 2020-10-27 PROCEDURE — 93880 EXTRACRANIAL BILAT STUDY: CPT

## 2020-10-27 PROCEDURE — 74176 CT ABD & PELVIS W/O CONTRAST: CPT

## 2020-10-29 ENCOUNTER — HOSPITAL ENCOUNTER (OUTPATIENT)
Dept: CARDIOLOGY | Facility: CLINIC | Age: 65
Discharge: HOME | End: 2020-10-29
Attending: INTERNAL MEDICINE | Admitting: INTERNAL MEDICINE
Payer: COMMERCIAL

## 2020-10-29 DIAGNOSIS — R06.02 SOB (SHORTNESS OF BREATH) ON EXERTION: ICD-10-CM

## 2020-10-29 PROCEDURE — 255N000002 HC RX 255 OP 636: Performed by: INTERNAL MEDICINE

## 2020-10-29 PROCEDURE — 93350 STRESS TTE ONLY: CPT | Mod: 26 | Performed by: INTERNAL MEDICINE

## 2020-10-29 PROCEDURE — 93018 CV STRESS TEST I&R ONLY: CPT | Performed by: INTERNAL MEDICINE

## 2020-10-29 PROCEDURE — 93016 CV STRESS TEST SUPVJ ONLY: CPT | Performed by: INTERNAL MEDICINE

## 2020-10-29 PROCEDURE — 93325 DOPPLER ECHO COLOR FLOW MAPG: CPT | Mod: 26 | Performed by: INTERNAL MEDICINE

## 2020-10-29 PROCEDURE — 93321 DOPPLER ECHO F-UP/LMTD STD: CPT | Mod: 26 | Performed by: INTERNAL MEDICINE

## 2020-10-29 PROCEDURE — 93017 CV STRESS TEST TRACING ONLY: CPT

## 2020-10-29 RX ADMIN — HUMAN ALBUMIN MICROSPHERES AND PERFLUTREN 9 ML: 10; .22 INJECTION, SOLUTION INTRAVENOUS at 14:30

## 2020-11-02 ENCOUNTER — TRANSFERRED RECORDS (OUTPATIENT)
Dept: HEALTH INFORMATION MANAGEMENT | Facility: CLINIC | Age: 65
End: 2020-11-02

## 2020-11-07 ENCOUNTER — HOSPITAL ENCOUNTER (EMERGENCY)
Facility: CLINIC | Age: 65
Discharge: HOME | End: 2020-11-07
Admitting: EMERGENCY MEDICINE
Payer: COMMERCIAL

## 2020-11-07 VITALS
WEIGHT: 189.6 LBS | BODY MASS INDEX: 32.54 KG/M2 | RESPIRATION RATE: 18 BRPM | SYSTOLIC BLOOD PRESSURE: 152 MMHG | TEMPERATURE: 98.1 F | HEART RATE: 82 BPM | DIASTOLIC BLOOD PRESSURE: 81 MMHG | OXYGEN SATURATION: 95 %

## 2020-11-07 PROCEDURE — 999N000104 HC STATISTIC NO CHARGE

## 2020-11-07 PROCEDURE — U0003 INFECTIOUS AGENT DETECTION BY NUCLEIC ACID (DNA OR RNA); SEVERE ACUTE RESPIRATORY SYNDROME CORONAVIRUS 2 (SARS-COV-2) (CORONAVIRUS DISEASE [COVID-19]), AMPLIFIED PROBE TECHNIQUE, MAKING USE OF HIGH THROUGHPUT TECHNOLOGIES AS DESCRIBED BY CMS-2020-01-R: HCPCS | Performed by: EMERGENCY MEDICINE

## 2020-11-07 PROCEDURE — C9803 HOPD COVID-19 SPEC COLLECT: HCPCS

## 2020-11-07 NOTE — ED TRIAGE NOTES
Pt arrives with dry cough for 1 day. 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 Pt is just requesting CoVID test and not an evaluation by a provider.

## 2020-11-07 NOTE — ED AVS SNAPSHOT
[facility]  [address]  [facility] [address]  Phone: [phone]  Fax: [phone]                                    Jamil Trejo   MRN: [id number]    Department: [facility]   Date of Visit: 11/7/2020           After Visit Summary Signature Page    I have received my discharge instructions, and my questions have been answered. I have discussed any challenges I see with this plan with the nurse or doctor.    ..........................................................................................................................................  Patient/Patient Representative Signature      ..........................................................................................................................................  Patient Representative Print Name and Relationship to Patient    ..................................................               ................................................  Date                                   Time    ..........................................................................................................................................  Reviewed by Signature/Title    ...................................................              ..............................................  Date                                               Time          22EPIC Rev 08/18

## 2020-11-09 LAB
SARS-COV-2 RNA SPEC QL NAA+PROBE: NOT DETECTED
SPECIMEN SOURCE: NORMAL

## 2020-11-18 DIAGNOSIS — K80.20 GALLSTONES: Primary | ICD-10-CM

## 2020-11-20 ENCOUNTER — OFFICE VISIT (OUTPATIENT)
Dept: SURGERY | Facility: CLINIC | Age: 65
End: 2020-11-20
Payer: COMMERCIAL

## 2020-11-20 VITALS
BODY MASS INDEX: 31.92 KG/M2 | DIASTOLIC BLOOD PRESSURE: 60 MMHG | WEIGHT: 187 LBS | HEIGHT: 64 IN | SYSTOLIC BLOOD PRESSURE: 110 MMHG | HEART RATE: 61 BPM

## 2020-11-20 DIAGNOSIS — K80.20 GALLSTONES: Primary | ICD-10-CM

## 2020-11-20 PROCEDURE — 99214 OFFICE O/P EST MOD 30 MIN: CPT | Performed by: SURGERY

## 2020-11-20 ASSESSMENT — MIFFLIN-ST. JEOR: SCORE: 1539.23

## 2020-11-20 NOTE — PROGRESS NOTES
We are asked by Dr. Valencia to see Dr. Trejo in consultation.  Chief complaint: gallstones found on CT  Left abdominal and flank pain    HPI:  This patient is a 65 year old male who presents with intermittent left flank pain for 3 weeks.  The pain is not associated with eating any type of food.  Additional associated symptoms include with nausea and anorexia.  He  does not have a history of jaundice or dark urine.  He  has not had pancreatitis in the past.    He has had kidney stones in the past. He went in for a CT to look for recurrent kidney stones. Gallstones were noted on the CT. He has not had right upper quadrant pain.     Past Medical History:   has a past medical history of Diabetes mellitus, type 2 (H), Hyperlipidemia, and Hypertension.    Past Surgical History:  Past Surgical History:   Procedure Laterality Date     TURP  2018      Additional abdominal operations: none    Social History:  Social History     Socioeconomic History     Marital status:      Spouse name: Not on file     Number of children: Not on file     Years of education: Not on file     Highest education level: Not on file   Occupational History     Not on file   Social Needs     Financial resource strain: Not on file     Food insecurity     Worry: Not on file     Inability: Not on file     Transportation needs     Medical: Not on file     Non-medical: Not on file   Tobacco Use     Smoking status: Never Smoker     Smokeless tobacco: Never Used   Substance and Sexual Activity     Alcohol use: Not on file     Drug use: Not on file     Sexual activity: Not on file   Lifestyle     Physical activity     Days per week: Not on file     Minutes per session: Not on file     Stress: Not on file   Relationships     Social connections     Talks on phone: Not on file     Gets together: Not on file     Attends [employer] service: Not on file     Active member of club or organization: Not on file     Attends meetings of clubs or  "organizations: Not on file     Relationship status: Not on file     Intimate partner violence     Fear of current or ex partner: Not on file     Emotionally abused: Not on file     Physically abused: Not on file     Forced sexual activity: Not on file   Other Topics Concern     Not on file   Social History Narrative     Not on file        Family History:  History reviewed. No pertinent family history.  Gallbladder disease: No    Review of Systems:  The 10 point Review of Systems is negative other than noted in the HPI and above.    Physical Exam:  /60   Pulse 61   Ht 1.626 m (5' 4\")   Wt 84.8 kg (187 lb)   BMI 32.10 kg/m    General - Well developed, well nourished male in no apparent distress  HEENT:  Head normocephalic and atraumatic, pupils equal and round, conjunctivae clear, no scleral icterus, mucous membranes moist, external ears and nose normal  Neck: Supple without thyromegaly or masses  Lymphatic: No cervical, or supraclavicular lymphadenopathy  Lungs: normal respiratory effort  Abdomen:   soft, flat, non-distended with no tenderness noted diffusely. no masses palpated  Extremities: Warm without edema  Musculoskeletal:  Normal station and gait  Neurologic: nonfocal  Psychiatric: Mood and affect appropriate  Skin: Without lesions or rashes, or jaundice    Relevant labs:  Liver function panel- normal  WBC- normal  Lipase- not performed    Imaging:  CT abdomen: positive cholelithiasis, negative gallbladder wall thickening, negative ductal dilatation, negative pericholecystic fluid,   Images reviewed with patient  Assessment and Plan:  It is my impression that Jamil has asymptomatic cholelithiasis.   I would suggest he leave his gallbladder alone. If he develops postprandial right abdominal pain or jaundice he will contact us.     He appears to understand and is comfortable with this plan.     Jhonathan Guido MD          "

## 2021-01-04 ENCOUNTER — HEALTH MAINTENANCE LETTER (OUTPATIENT)
Age: 66
End: 2021-01-04

## 2021-01-08 ENCOUNTER — COMMUNICATION - HEALTHEAST (OUTPATIENT)
Dept: INTERNAL MEDICINE | Facility: CLINIC | Age: 66
End: 2021-01-08

## 2021-01-08 DIAGNOSIS — E11.9 TYPE 2 DIABETES MELLITUS WITHOUT COMPLICATION, WITHOUT LONG-TERM CURRENT USE OF INSULIN (H): ICD-10-CM

## 2021-02-02 ENCOUNTER — ALLIED HEALTH/NURSE VISIT (OUTPATIENT)
Dept: NURSING | Facility: CLINIC | Age: 66
End: 2021-02-02

## 2021-02-02 DIAGNOSIS — Z23 NEED FOR VACCINATION: Primary | ICD-10-CM

## 2021-02-02 PROCEDURE — 90750 HZV VACC RECOMBINANT IM: CPT

## 2021-02-02 PROCEDURE — 90471 IMMUNIZATION ADMIN: CPT

## 2021-02-02 PROCEDURE — 99207 PR NO CHARGE NURSE ONLY: CPT

## 2021-03-07 ENCOUNTER — HEALTH MAINTENANCE LETTER (OUTPATIENT)
Age: 66
End: 2021-03-07

## 2021-03-23 DIAGNOSIS — E11.9 TYPE 2 DIABETES MELLITUS WITHOUT COMPLICATION, WITHOUT LONG-TERM CURRENT USE OF INSULIN (H): ICD-10-CM

## 2021-03-23 DIAGNOSIS — I10 HTN, GOAL BELOW 140/90: ICD-10-CM

## 2021-03-23 DIAGNOSIS — E78.5 HYPERLIPIDEMIA LDL GOAL <100: ICD-10-CM

## 2021-03-24 RX ORDER — LISINOPRIL 5 MG/1
5 TABLET ORAL DAILY
Qty: 90 TABLET | Refills: 4 | Status: SHIPPED | OUTPATIENT
Start: 2021-03-24 | End: 2022-04-25

## 2021-03-24 RX ORDER — SIMVASTATIN 20 MG
20 TABLET ORAL AT BEDTIME
Qty: 90 TABLET | Refills: 4 | Status: SHIPPED | OUTPATIENT
Start: 2021-03-24 | End: 2022-04-25

## 2021-05-27 NOTE — PATIENT INSTRUCTIONS - HE
Patient Education   Personalized Prevention Plan  You are due for the preventive services outlined below.  Your care team is available to assist you in scheduling these services.  If you have already completed any of these items, please share that information with your care team to update in your medical record.  Health Maintenance   Topic Date Due     ZOSTER VACCINES (2 of 3) 03/07/2017     ADVANCE DIRECTIVES DISCUSSED WITH PATIENT  11/21/2017     DIABETES OPHTHALMOLOGY EXAM  04/21/2018     DIABETES FOOT EXAM  01/11/2019     DIABETES URINE MICROALBUMIN  01/11/2019     PNEUMOCOCCAL POLYSACCHARIDE VACCINE AGE 65 AND OVER  02/01/2019     FALL RISK ASSESSMENT  02/01/2019     DIABETES HEMOGLOBIN A1C  03/18/2019     DIABETES FOLLOW-UP  03/18/2019     COLONOSCOPY  12/02/2019     TD 18+ HE  01/11/2028     INFLUENZA VACCINE RULE BASED  Completed     TDAP ADULT ONE TIME DOSE  Completed     PNEUMOCOCCAL CONJUGATE VACCINE FOR ADULTS (PCV13 OR PREVNAR)  Completed

## 2021-05-27 NOTE — PROGRESS NOTES
Office Visit - Physical   Jamil Trejo   64 y.o.  male    Date of visit: 4/17/2019  Physician: Baldomero Coyle MD     Assessment and Plan   1. Routine general medical examination at a health care facility  Advised his comprehensive physical exam is normal except for his weight.    2. Mixed hyperlipidemia  Controlled.  Last lipids were good, LDL 90, HDL 32, triglycerides 161 and total cholesterol 154 on 9/18/2018.  Fasting today.  Check lipids, TSH and liver function.  - simvastatin (ZOCOR) 20 MG tablet; Take 1 tablet (20 mg total) by mouth at bedtime.  Dispense: 90 tablet; Refill: 3  - Lipid Cascade  - Thyroid Stimulating Hormone (TSH)  - Hepatic Profile    3. Type 2 diabetes mellitus without complication, without long-term current use of insulin (H)  Controlled.  Last A1c was 6.3.  Continue metformin.  Check A1c basic metabolic panel and urine microalbumin.  Due to see his eye doctor for his diabetic eye exam next week.  Monofilament test and foot exam are normal.  - metFORMIN (GLUCOPHAGE) 500 MG tablet; Take 1 tablet (500 mg total) by mouth 2 (two) times a day with meals.  Dispense: 180 tablet; Refill: 3  - Glycosylated Hemoglobin A1c  - Basic Metabolic Panel  - Microalbumin, Random Urine    4. Essential hypertension  Controlled.  Continue lisinopril.  Check CBC and urinalysis.  - lisinopril (PRINIVIL,ZESTRIL) 5 MG tablet; Take 1 tablet (5 mg total) by mouth daily.  Dispense: 90 tablet; Refill: 3  - HM2(CBC w/o Differential)  - Urinalysis-UC if Indicated    5. Benign prostatic hyperplasia without lower urinary tract symptoms s/p TURP 6/28/18  No urinary symptoms.  Status post TURP 6/28/2018 for BPH.  Doing well.  Does not follow with urology anymore.    6. Male Erectile Disorder  Has erectile dysfunction.  Takes sildenafil as needed.  Apparently gets his sildenafil in [place] where he is originally from.    7. Need for prophylactic vaccination against Streptococcus pneumoniae (pneumococcus) and  influenza  Pneumovax was given today.  Had his Prevnar 13 in  January 2018  - Pneumococcal polysaccharide vaccine 23-valent 3 yo or older, subq/IM    The following high BMI interventions were performed this visit: encouragement to exercise and weight monitoring.  Has mild obesity, BMI of 33.        FOLLOWUP IN 6 months.     Chief Complaint   Annual Exam (fasting, had eye exam in May-[facility], needs foot exam.) and Immunizations (Pneumo 23, wants to discuss Shingrix)       Patient Profile   Social History     Social History Narrative    , 1 daughter, 17 years old. 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 Non alcohol drinker.        Past Medical History   Patient Active Problem List   Diagnosis     Nephrolithiasis     Hyperlipidemia     Essential Hypertension     Benign Polyps Of The Large Intestine     Male Erectile Disorder     Benign prostatic hyperplasia with lower urinary tract symptoms, symptom details unspecified     Diabetes mellitus, type 2 (H)       Past Surgical History  He has a past surgical history that includes pr removal of sperm duct(s) and pr transurethral elec-surg prostatectom (N/A, 6/28/2018).     History of Present Illness   This 64 y.o. old male is here for his comprehensive physical exam.   Has hypertension controlled by lisinopril.  Has hyperlipidemia controlled by simvastatin.  Has diabetes controlled by metformin alone.  Status post TURP in June 2018 for BPH.  No doing well without urinary symptoms.  Stopped seeing his urologist.  He has erectile dysfunction.  Takes sildenafil as needed.  Denies chest pains and shortness of breath.  Sees well.  Will see his eye doctor next week for his diabetic eye exam.  Denies  urinary and bowel symptoms.  Sleeps well.  Sexually active.  But gets erectile dysfunction intermittently.  Takes  sildenafil as needed.  Obese based on his BMI.  Reports he walks 3 times a week for his exercise.  Overall, doing and feeling well.  No  "complaints.    Review of Systems: A comprehensive review of systems was negative except as noted.     Medications and Allergies   Current Outpatient Medications   Medication Sig     lisinopril (PRINIVIL,ZESTRIL) 5 MG tablet Take 1 tablet (5 mg total) by mouth daily.     metFORMIN (GLUCOPHAGE) 500 MG tablet Take 1 tablet (500 mg total) by mouth 2 (two) times a day with meals.     simvastatin (ZOCOR) 20 MG tablet Take 1 tablet (20 mg total) by mouth at bedtime.     vitamin A-vitamin C-vit E-min (OCUVITE) Tab tablet Take 1 tablet by mouth at bedtime.     No Known Allergies     Family and Social History   Family History   Problem Relation Age of Onset     No Medical Problems Mother      No Medical Problems Father      No Medical Problems Sister         Social History     Tobacco Use     Smoking status: Never Smoker     Smokeless tobacco: Never Used   Substance Use Topics     Alcohol use: No     Drug use: No        Physical Exam   General Appearance:   Alert, not in acute distress, pleasant and comfortable    /68   Pulse 65   Ht 5' 4\" (1.626 m)   Wt 193 lb (87.5 kg)   SpO2 98%   BMI 33.13 kg/m      EYES: Eyelids, conjunctiva, and sclera are normal. Pupils are normal. Cornea, iris, and lens are normal bilaterally.  HEAD, EARS, NOSE, MOUTH, AND THROAT: Head and face are normal. Hearing is normal to voice and the ears are normal to external exam. Nose appearance is normal and there is no discharge. Oropharynx is normal.  NECK: Neck appearance is normal. There are no neck masses and the thyroid is not enlarged.  RESPIRATORY: Breathing pattern is normal and the chest moves symmetrically.  Percussion/auscultatory percussion is normal.  Lung sounds are normal and there are no abnormal sounds.  CARDIOVASCULAR: Heart rate and rhythm are normal.  S1 and S2 are normal and there are no extra sounds or murmurs. Peripheral pulses in arms and legs are normal.  Jugular venous pressure is normal.  There is no peripheral " edema.  GASTROINTESTINAL: The abdomen is normal in contour.  Bowel sounds are present.  Percussion detected no organ enlargement or tenderness.  Palpation detected no tenderness, mass, or enlarged organs.   MUSCULOSKELETAL: Skeletal configuration is normal and muscle mass is normal for age. Joint appearance is overall normal.  LYMPHATIC: There are no enlarged nodes.  SKIN/HAIR/NAILS: Skin color is normal.  There are no skin lesions.  Hair and nails are normal.  NEUROLOGIC: The patient is alert and oriented to person, place, time, and circumstance. Speech is normal. Cranial nerves are normal. Motor strength is normal for age. The patient is normally coordinated.  PSYCHIATRIC:  Mood and affect are normal and the patient has normal recent and remote memory. The patient's judgment and insight are normal.      RECTAL: Deferred.  GENITAL/URINARY: Deferred.     Additional Information        Baldomero Coyle MD  Internal Medicine  Contact me at [phone]

## 2021-05-30 VITALS — BODY MASS INDEX: 33.97 KG/M2 | WEIGHT: 199 LBS | HEIGHT: 64 IN

## 2021-05-31 VITALS — BODY MASS INDEX: 34.49 KG/M2 | HEIGHT: 64 IN | WEIGHT: 202 LBS

## 2021-06-01 VITALS — WEIGHT: 181 LBS | HEIGHT: 64 IN | BODY MASS INDEX: 30.9 KG/M2

## 2021-06-01 VITALS — WEIGHT: 194 LBS | HEIGHT: 64 IN | BODY MASS INDEX: 33.12 KG/M2

## 2021-06-01 VITALS — BODY MASS INDEX: 31.92 KG/M2 | WEIGHT: 187 LBS | HEIGHT: 64 IN

## 2021-06-02 VITALS — HEIGHT: 64 IN | WEIGHT: 186 LBS | BODY MASS INDEX: 31.76 KG/M2

## 2021-06-03 VITALS — BODY MASS INDEX: 32.95 KG/M2 | WEIGHT: 193 LBS | HEIGHT: 64 IN

## 2021-06-03 NOTE — TELEPHONE ENCOUNTER
Jamil has an upcoming lab appointment.  There are no orders and no indication in the schedule line why he is coming.

## 2021-06-07 NOTE — TELEPHONE ENCOUNTER
RN cannot approve Refill Request    RN can NOT refill this medication Protocol failed and NO refill given.      Gaby Valdez, [facility] Triage/Med Refill 3/23/2020    Requested Prescriptions   Pending Prescriptions Disp Refills     simvastatin (ZOCOR) 20 MG tablet [Pharmacy Med Name: Simvastatin Oral Tablet 20 MG] 90 tablet 2     Sig: TAKE 1 TABLET BY MOUTH AT BEDTIME       Statins Refill Protocol (Hmg CoA Reductase Inhibitors) Passed - 3/22/2020  1:39 PM        Passed - PCP or prescribing provider visit in past 12 months      Last office visit with prescriber/PCP: 9/18/2018 Baldomero Coyle MD OR same dept: Visit date not found OR same specialty: 9/18/2018 Baldomero Coyle MD  Last physical: 4/17/2019 Last MTM visit: Visit date not found   Next visit within 3 mo: Visit date not found  Next physical within 3 mo: Visit date not found  Prescriber OR PCP: Baldomero Coyle MD  Last diagnosis associated with med order: 1. Mixed hyperlipidemia  - simvastatin (ZOCOR) 20 MG tablet [Pharmacy Med Name: Simvastatin Oral Tablet 20 MG]; TAKE 1 TABLET BY MOUTH AT BEDTIME  Dispense: 90 tablet; Refill: 2    2. Essential hypertension  - lisinopriL (PRINIVIL,ZESTRIL) 5 MG tablet [Pharmacy Med Name: Lisinopril Oral Tablet 5 MG]; TAKE ONE TABLET BY MOUTH ONE TIME DAILY   Dispense: 90 tablet; Refill: 2    3. Type 2 diabetes mellitus without complication, without long-term current use of insulin (H)  - metFORMIN (GLUCOPHAGE) 500 MG tablet [Pharmacy Med Name: metFORMIN HCl Oral Tablet 500 MG]; TAKE ONE TABLET BY MOUTH TWICE A DAY WITH MEALS   Dispense: 180 tablet; Refill: 2    If protocol passes may refill for 12 months if within 3 months of last provider visit (or a total of 15 months).                lisinopriL (PRINIVIL,ZESTRIL) 5 MG tablet [Pharmacy Med Name: Lisinopril Oral Tablet 5 MG] 90 tablet 2     Sig: TAKE ONE TABLET BY MOUTH ONE TIME DAILY       Ace Inhibitors Refill Protocol Passed - 3/22/2020  1:39 PM         Passed - PCP or prescribing provider visit in past 12 months       Last office visit with prescriber/PCP: 9/18/2018 Baldomero Coyle MD OR same dept: Visit date not found OR same specialty: 9/18/2018 Baldomero Coyle MD  Last physical: 4/17/2019 Last MTM visit: Visit date not found   Next visit within 3 mo: Visit date not found  Next physical within 3 mo: Visit date not found  Prescriber OR PCP: Baldomero Coyle MD  Last diagnosis associated with med order: 1. Mixed hyperlipidemia  - simvastatin (ZOCOR) 20 MG tablet [Pharmacy Med Name: Simvastatin Oral Tablet 20 MG]; TAKE 1 TABLET BY MOUTH AT BEDTIME  Dispense: 90 tablet; Refill: 2    2. Essential hypertension  - lisinopriL (PRINIVIL,ZESTRIL) 5 MG tablet [Pharmacy Med Name: Lisinopril Oral Tablet 5 MG]; TAKE ONE TABLET BY MOUTH ONE TIME DAILY   Dispense: 90 tablet; Refill: 2    3. Type 2 diabetes mellitus without complication, without long-term current use of insulin (H)  - metFORMIN (GLUCOPHAGE) 500 MG tablet [Pharmacy Med Name: metFORMIN HCl Oral Tablet 500 MG]; TAKE ONE TABLET BY MOUTH TWICE A DAY WITH MEALS   Dispense: 180 tablet; Refill: 2    If protocol passes may refill for 12 months if within 3 months of last provider visit (or a total of 15 months).             Passed - Serum Potassium in past 12 months     Lab Results   Component Value Date    Potassium 4.5 11/26/2019             Passed - Blood pressure filed in past 12 months     BP Readings from Last 1 Encounters:   04/17/19 108/68             Passed - Serum Creatinine in past 12 months     Creatinine   Date Value Ref Range Status   11/26/2019 0.89 0.70 - 1.30 mg/dL Final                metFORMIN (GLUCOPHAGE) 500 MG tablet [Pharmacy Med Name: metFORMIN HCl Oral Tablet 500 MG] 180 tablet 2     Sig: TAKE ONE TABLET BY MOUTH TWICE A DAY WITH MEALS       Metformin Refill Protocol Failed - 3/22/2020  1:39 PM        Failed - Visit with PCP or prescribing provider visit in last 6 months or next 3 months      Last office visit with prescriber/PCP: Visit date not found OR same dept: Visit date not found OR same specialty: 9/18/2018 Baldomero Coyle MD Last physical: Visit date not found Last MTM visit: Visit date not found         Next appt within 3 mo: Visit date not found  Next physical within 3 mo: Visit date not found  Prescriber OR PCP: Baldomero Coyle MD  Last diagnosis associated with med order: 1. Mixed hyperlipidemia  - simvastatin (ZOCOR) 20 MG tablet [Pharmacy Med Name: Simvastatin Oral Tablet 20 MG]; TAKE 1 TABLET BY MOUTH AT BEDTIME  Dispense: 90 tablet; Refill: 2    2. Essential hypertension  - lisinopriL (PRINIVIL,ZESTRIL) 5 MG tablet [Pharmacy Med Name: Lisinopril Oral Tablet 5 MG]; TAKE ONE TABLET BY MOUTH ONE TIME DAILY   Dispense: 90 tablet; Refill: 2    3. Type 2 diabetes mellitus without complication, without long-term current use of insulin (H)  - metFORMIN (GLUCOPHAGE) 500 MG tablet [Pharmacy Med Name: metFORMIN HCl Oral Tablet 500 MG]; TAKE ONE TABLET BY MOUTH TWICE A DAY WITH MEALS   Dispense: 180 tablet; Refill: 2     If protocol passes may refill for 12 months if within 3 months of last provider visit (or a total of 15 months).           Passed - Blood pressure in last 12 months     BP Readings from Last 1 Encounters:   04/17/19 108/68             Passed - LFT or AST or ALT in last 12 months     Albumin   Date Value Ref Range Status   11/26/2019 4.2 3.5 - 5.0 g/dL Final     Bilirubin, Total   Date Value Ref Range Status   11/26/2019 0.9 0.0 - 1.0 mg/dL Final     Bilirubin, Direct   Date Value Ref Range Status   11/26/2019 0.3 <=0.5 mg/dL Final     Alkaline Phosphatase   Date Value Ref Range Status   11/26/2019 55 45 - 120 U/L Final     AST   Date Value Ref Range Status   11/26/2019 22 0 - 40 U/L Final     ALT   Date Value Ref Range Status   11/26/2019 36 0 - 45 U/L Final     Protein, Total   Date Value Ref Range Status   11/26/2019 7.6 6.0 - 8.0 g/dL Final                Passed - GFR  or Serum Creatinine in last 6 months     GFR MDRD Non Af Amer   Date Value Ref Range Status   11/26/2019 >60 >60 mL/min/1.73m2 Final     GFR MDRD Af Amer   Date Value Ref Range Status   11/26/2019 >60 >60 mL/min/1.73m2 Final             Passed - A1C in last 6 months     Hemoglobin A1c   Date Value Ref Range Status   11/26/2019 6.9 (H) 3.5 - 6.0 % Final               Passed - Microalbumin in last year      Microalbumin, Random Urine   Date Value Ref Range Status   04/17/2019 1.44 0.00 - 1.99 mg/dL Final

## 2021-06-08 NOTE — PROGRESS NOTES
Office Visit-Comprehensive Physical Exam   Jamil Trejo   61 y.o. male    Date of Visit: 1/10/2017    Chief Complaint   Patient presents with     Annual Exam     Fasting for labs.         Assessment and Plan   1. Routine general medical examination at a health care facility  Inform his comprehensive physical exam is normal. But he needs to lose some weight which he is trying. Based on his BMI of 33 he falls into obesity.     2. Hyperlipidemia  Controlled. Continue simvastatin. Check lipids and liver.   - Lipid Cascade  - Hepatic Profile    3. Essential hypertension  Controlled. Continue lisinopril. Check cbc and bmp.   - HM2(CBC w/o Differential)  - Basic Metabolic Panel  - lisinopril (PRINIVIL,ZESTRIL) 10 MG tablet; Take 1 tablet (10 mg total) by mouth daily.  Dispense: 90 tablets; Refill: 3    4. Male Erectile Disorder  Has intermittent ED. Takes viagra as needed. Does not need it all the time.     5. BPH (benign prostatic hyperplasia)  Has BPH. Denies urinary complaints with tamsulosin. Does not want his PSA checked. Check urinalysis.   - Urinalysis-UC if Indicated  - tamsulosin (FLOMAX) 0.4 mg Cp24; Take 1 capsule (0.4 mg total) by mouth daily.  Dispense: 90 capsule; Refill: 3    6. Benign Polyps Of The Large Intestine  Due for colonoscopy in 2019. Has colon polyps on his colonoscopy in 2014.     7.  Need for shingles vaccine  Given his shingle vaccination.   - Varicella-zoster vaccine subq    The following high BMI interventions were performed this visit: encouragement to exercise and weight monitoring      Follow up in 6 months.      History of Present Illness   This 61 y.o. old male is here for his comprehensive physical exam. Reports he feels good. does not have complaints. Has hypertension and hyperlipidemia which are controlled. Has BPH without urinary symptoms. Used to see urology but stopped he did not have anymore frequency with tamsulosin. Has ED. Reports he does not need to take viagra regularly.  Takes it only as needed. Sees his eye MD, Dr. Dozier yearly. Found to have mild cataract. But sees well. No hearing problems. Denies chest pains and shortness of breath. Plays tennis once a week as his exercise. Based on his BMI he falls into obesity. Denies urinary and bowel symptoms. Sleeps well. Eats well. No aches and pains. Only drinks alcohol on social occassions and never a smoker.      Review of Systems   A 12 point comprehensive review of systems was negative except as noted..     Medications, Allergies and Problem List   Reviewed and updated             Chief Complaint   Annual Exam (Fasting for labs. )       Patient Profile   Social History     Social History Narrative    , 1 daughter, 16 years old. 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 Non alcohol drinker.        Past Medical History   Patient Active Problem List   Diagnosis     Cerumen Impaction     Nephrolithiasis     Benign Prostatic Hypertrophy     Hyperlipidemia     Essential Hypertension     Gastritis     Hyperglycemia     Benign Polyps Of The Large Intestine     Male Erectile Disorder       Past Surgical History  He has a past surgical history that includes removal of sperm duct(s).       Medications and Allergies   Current Outpatient Prescriptions   Medication Sig     lisinopril (PRINIVIL,ZESTRIL) 10 MG tablet Take 1 tablet (10 mg total) by mouth daily.     sildenafil (VIAGRA) 50 MG tablet Take 1 tablet (50 mg total) by mouth daily as needed (one hour before intercoursebe).     simvastatin (ZOCOR) 20 MG tablet Take 1 tablet (20 mg total) by mouth bedtime.     tamsulosin (FLOMAX) 0.4 mg Cp24 Take 1 capsule (0.4 mg total) by mouth daily.     VIT A/VIT C/VIT E/ZINC/COPPER (PRESERVISION AREDS ORAL) Take by mouth daily.     No Known Allergies     Family and Social History   Family History   Problem Relation Age of Onset     No Medical Problems Mother      No Medical Problems Father      No Medical Problems Sister         Social History  "  Substance Use Topics     Smoking status: Never Smoker     Smokeless tobacco: Never Used     Alcohol use No        Physical Exam       Physical Exam  Visit Vitals     /64     Pulse 70     Resp 18     Ht 5' 4.25\" (1.632 m)     Wt 199 lb (90.3 kg)     BMI 33.89 kg/m2       General Appearance:    Alert, cooperative, no distress, appears stated age, obese,    pleasant   Head:    Normocephalic, without obvious abnormality, atraumatic   Eyes:    PERRL, conjunctiva/corneas clear, EOM's intact, fundi     benign, both eyes        Ears:    Normal TM's and external ear canals, both ears   Nose:   Nares normal, septum midline, mucosa normal, no drainage    or sinus tenderness   Throat:   Lips, mucosa, and tongue normal; teeth and gums normal   Neck:   Supple, symmetrical, trachea midline, no adenopathy;        thyroid:  No enlargement/tenderness/nodules; no carotid    bruit or JVD   Back:     Symmetric, no curvature, ROM normal, no CVA tenderness   Lungs:     Clear to auscultation bilaterally, respirations unlabored   Chest wall:    No tenderness or deformity   Heart:    Regular rate and rhythm, S1 and S2 normal, no murmur, rub   or gallop   Abdomen:     Soft, non-tender, bowel sounds active all four quadrants,     no masses, no organomegaly   Genitalia:    Normal male without lesion, discharge or tenderness,    circumcised   Rectal:    Deferred. Patient declines   Extremities:   Extremities normal, atraumatic, no cyanosis or edema   Pulses:   2+ and symmetric all extremities   Skin:   Skin color, texture, turgor normal, no rashes or lesions   Lymph nodes:   Cervical, supraclavicular, and axillary nodes normal   Neurologic:   CNII-XII intact. Normal strength, sensation and reflexes       throughout        Additional Information        Baldomero Coyle MD  Internal Medicine  Contact me at [phone]     Additional Information   Current Outpatient Prescriptions   Medication Sig     lisinopril (PRINIVIL,ZESTRIL) 10 MG " tablet Take 1 tablet (10 mg total) by mouth daily.     sildenafil (VIAGRA) 50 MG tablet Take 1 tablet (50 mg total) by mouth daily as needed (one hour before intercoursebe).     simvastatin (ZOCOR) 20 MG tablet Take 1 tablet (20 mg total) by mouth bedtime.     tamsulosin (FLOMAX) 0.4 mg Cp24 Take 1 capsule (0.4 mg total) by mouth daily.     VIT A/VIT C/VIT E/ZINC/COPPER (PRESERVISION AREDS ORAL) Take by mouth daily.     No Known Allergies  Social History   Substance Use Topics     Smoking status: Never Smoker     Smokeless tobacco: Never Used     Alcohol use No

## 2021-06-14 NOTE — TELEPHONE ENCOUNTER
RN cannot approve Refill Request    RN can NOT refill this medication PCP messaged that patient is overdue for Labs, Office Visit and BP. Last office visit: 9/18/2018 Baldomero Coyle MD Last Physical: 4/17/2019 Last MTM visit: Visit date not found Last visit same specialty: 9/18/2018 Baldomero Coyle MD.  Next visit within 3 mo: Visit date not found  Next physical within 3 mo: Visit date not found      Sophie Hardy, [facility] Triage/Med Refill 1/8/2021    Requested Prescriptions   Pending Prescriptions Disp Refills     metFORMIN (GLUCOPHAGE) 500 MG tablet [Pharmacy Med Name: metFORMIN HCl Oral Tablet 500 MG] 180 tablet 0     Sig: TAKE 1 TABLET BY MOUTH TWICE DAILY WITH MEALS       Metformin Refill Protocol Failed - 1/8/2021  4:32 PM        Failed - Blood pressure in last 12 months     BP Readings from Last 1 Encounters:   04/17/19 108/68             Failed - LFT or AST or ALT in last 12 months     Albumin   Date Value Ref Range Status   11/26/2019 4.2 3.5 - 5.0 g/dL Final     Bilirubin, Total   Date Value Ref Range Status   11/26/2019 0.9 0.0 - 1.0 mg/dL Final     Bilirubin, Direct   Date Value Ref Range Status   11/26/2019 0.3 <=0.5 mg/dL Final     Alkaline Phosphatase   Date Value Ref Range Status   11/26/2019 55 45 - 120 U/L Final     AST   Date Value Ref Range Status   11/26/2019 22 0 - 40 U/L Final     ALT   Date Value Ref Range Status   11/26/2019 36 0 - 45 U/L Final     Protein, Total   Date Value Ref Range Status   11/26/2019 7.6 6.0 - 8.0 g/dL Final                Failed - GFR or Serum Creatinine in last 6 months     GFR MDRD Non Af Amer   Date Value Ref Range Status   11/26/2019 >60 >60 mL/min/1.73m2 Final     GFR MDRD Af Amer   Date Value Ref Range Status   11/26/2019 >60 >60 mL/min/1.73m2 Final             Failed - Visit with PCP or prescribing provider visit in last 6 months or next 3 months     Last office visit with prescriber/PCP: Visit date not found OR same dept: Visit date not  found OR same specialty: 9/18/2018 Baldomero Coyle MD Last physical: Visit date not found Last MTM visit: Visit date not found         Next appt within 3 mo: Visit date not found  Next physical within 3 mo: Visit date not found  Prescriber OR PCP: Baldomero Coyle MD  Last diagnosis associated with med order: 1. Type 2 diabetes mellitus without complication, without long-term current use of insulin (H)  - metFORMIN (GLUCOPHAGE) 500 MG tablet [Pharmacy Med Name: metFORMIN HCl Oral Tablet 500 MG]; TAKE 1 TABLET BY MOUTH TWICE DAILY WITH MEALS  Dispense: 180 tablet; Refill: 0     If protocol passes may refill for 12 months if within 3 months of last provider visit (or a total of 15 months).           Failed - A1C in last 6 months     Hemoglobin A1c   Date Value Ref Range Status   11/26/2019 6.9 (H) 3.5 - 6.0 % Final               Failed - Microalbumin in last year      Microalbumin, Random Urine   Date Value Ref Range Status   04/17/2019 1.44 0.00 - 1.99 mg/dL Final

## 2021-06-15 NOTE — PROGRESS NOTES
Office Visit - Physical   Jamil Trejo   62 y.o.  male    Date of visit: 1/11/2018  Physician: Baldomero Coyle MD     Assessment and Plan   1. Routine general medical examination at a health care facility  Informed and advised his comprehensive physical exam is normal except for his weight.  Falls into obesity having BMI of 34.  Reports he exercises regularly by walking miles and miles daily.  But this is due to his walking at his work  and not as a separate routine exercise.  Encouraged to exercise regularly and separately.    2. Essential hypertension  Controlled.  Continue lisinopril 10 mg daily.  Only takes chlorthalidone as needed.  Check CBC.  - chlorthalidone (HYGROTEN) 25 MG tablet; Take 1 tablet (25 mg total) by mouth daily.  Dispense: 90 tablet; Refill: 3  - lisinopril (PRINIVIL,ZESTRIL) 10 MG tablet; Take 1 tablet (10 mg total) by mouth daily.  Dispense: 90 tablet; Refill: 3  - HM2(CBC w/o Differential)    3. Hyperlipidemia  Takes simvastatin.  But his last lipids were not controlled on 1/10/2017 showing , HDL 30, triglyceride 160 and total cholesterol 249.  Will need adjustment of his simvastatin dose after he gets his lipids today.  Check lipids, TSH and liver function.  - simvastatin (ZOCOR) 20 MG tablet; TAKE ONE TABLET (20MG TOTAL) BY MOUTH AT BEDTIME  Dispense: 90 tablet; Refill: 2  - Lipid Cascade  - Thyroid Stimulating Hormone (TSH)  - Hepatic Profile    4. Prediabetes  Also shows prediabetes but A1c is now increased to 7.1.  Agrees to take metformin if his A1c is still elevated today.  Await A1.  Checkc A1c, basic metabolic panel and urine microalbumin.  - Glycosylated Hemoglobin A1c  - Microalbumin, Random Urine  - Basic Metabolic Panel    5. Gastritis  Suffers from gastritis.  Comes off and on.  Happens when he eats food with high acidity.  Wants to have upper GI endoscopy.  Will refer him to [facility] for upper endoscopy.  - Ambulatory referral for Upper GI Endoscopy    6.  Benign prostatic hyperplasia with lower urinary tract symptoms, symptom details unspecified  Followed by urology.  Saw Dr. Carter last month.  Tamsulosin was increased from 0.4 mg 2.8 mg at night because of nocturia.  Was advised to undergo prostatectomy and he wants done in the summer.  Will come back to see me for his preop history and physical exam once is scheduled for prostatectomy.  Check PSA.  Continue tamsulosin.  - tamsulosin (FLOMAX) 0.4 mg Cp24; Take 2 capsules (0.8 mg total) by mouth daily.  Dispense: 180 capsule; Refill: 3  - PSA (Prostatic-Specific Antigen), Diagnostic    7. Need for vaccination for Strep pneumoniae  Wants to get Prevnar 13.  Will give it today.  - Pneumococcal conjugate vaccine 13-valent 6wks-17yrs; >50yrs    8. Need for prophylactic vaccination with tetanus-diphtheria (TD)  Wants tetanus diphtheria also.  Will give it today since he is due anyway.  - Td, Preservative Free (green label)    The following high BMI interventions were performed this visit: encouragement to exercise and weight monitoring     Up-to-date with his colonoscopy.  Due for repeat colonoscopy in 2019 because of  history of colon polyps.      FOLLOWUP IN in a year for repeat comprehensive physical exam.  Does not want to come for follow-up for his lipids and diabetes.     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        Past Medical History   Patient Active Problem List   Diagnosis     Cerumen Impaction     Nephrolithiasis     BPH (benign prostatic hyperplasia)     Hyperlipidemia     Essential Hypertension     Gastritis     Hyperglycemia     Benign Polyps Of The Large Intestine     Male Erectile Disorder     Benign prostatic hyperplasia with lower urinary tract symptoms, symptom details unspecified       Past  Surgical History  He has a past surgical history that includes pr removal of sperm duct(s).     History of Present Illness   This 62 y.o. old male is here for his comprehensive physical exam.  Doing well.  But his lipids are not well controlled despite simvastatin.  Also shows prediabetes.  His last A1c was 7.1 suggesting he will now need a diabetic medication.  Lipids also are not ideally controlled with current dose of simvastatin.  Will need adjustment of his simvastatin dose.  Await his A1c and lipids today.  Has hypertension controlled by lisinopril.  Only takes chlorthalidone when he needs it.  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  Has BPH.  Followed by urology.  Was advised prostatectomy by his urologist because of lower urinary symptoms.  Wants to wait until summer to have this done.  Takes  tamsulosin.  Dose was increased during his last visit with urology a month ago.  Complains of midepigastric distress and discomfort.  Comes off and on.  Usually precipitated by acidic food.  Likes to have an upper endoscopy.  Also wants to get Prevnar 13 and Td today.  Up-to-date with his colonoscopy.    Denies hearing difficulties.  Reports his right ear forms wax  easily.  Was seen by his eye doctor last summer.  Was informed by Dr. Méndez he has starting cataract.  Denies chest pain and shortness of breath.  Walks daily not as his exercise but part of his work since he works as a hospitalist.  Denies urinary bowel symptoms.  Has erectile dysfunction.  Takes Viagra as needed.  Sleeps well.  Eats well.    Review of Systems: A comprehensive review of systems was negative except as noted.     Medications and Allergies   Current Outpatient Prescriptions   Medication Sig     sildenafil (VIAGRA) 50 MG tablet Take 1 tablet (50 mg total) by mouth daily as needed (one hour before intercoursebe).     VIT A/VIT C/VIT E/ZINC/COPPER (PRESERVISION AREDS ORAL) Take by mouth daily.      "chlorthalidone (HYGROTEN) 25 MG tablet Take 1 tablet (25 mg total) by mouth daily.     lisinopril (PRINIVIL,ZESTRIL) 10 MG tablet Take 1 tablet (10 mg total) by mouth daily.     simvastatin (ZOCOR) 20 MG tablet TAKE ONE TABLET (20MG TOTAL) BY MOUTH AT BEDTIME     tamsulosin (FLOMAX) 0.4 mg Cp24 Take 2 capsules (0.8 mg total) by mouth daily.     No Known Allergies     Family and Social History   Family History   Problem Relation Age of Onset     No Medical Problems Mother      No Medical Problems Father      No Medical Problems Sister         Social History   Substance Use Topics     Smoking status: Never Smoker     Smokeless tobacco: Never Used     Alcohol use No        Physical Exam   General Appearance:   Alert, pleasant, comfortable and not in acute distress.  Mildly obese.    /76  Pulse 72  Ht 5' 4\" (1.626 m)  Wt 202 lb (91.6 kg)  SpO2 98%  BMI 34.67 kg/m2    EYES: Eyelids, conjunctiva, and sclera are normal. Pupils are normal. Cornea, iris, and lens are normal bilaterally.  HEAD, EARS, NOSE, MOUTH, AND THROAT: Head and face are normal. Hearing is normal to voice and the ears are normal to external exam. Nose appearance is normal and there is no discharge. Oropharynx is normal.  NECK: Neck appearance is normal. There are no neck masses and the thyroid is not enlarged.  RESPIRATORY: Breathing pattern is normal and the chest moves symmetrically.  Percussion/auscultatory percussion is normal.  Lung sounds are normal and there are no abnormal sounds.  CARDIOVASCULAR: Heart rate and rhythm are normal.  S1 and S2 are normal and there are no extra sounds or murmurs. Peripheral pulses in arms and legs are normal.  Jugular venous pressure is normal.  There is no peripheral edema.  GASTROINTESTINAL: The abdomen is normal in contour.  Bowel sounds are present.  Percussion detected no organ enlargement or tenderness.  Palpation detected no tenderness, mass, or enlarged organs.   MUSCULOSKELETAL: Skeletal " configuration is normal and muscle mass is normal for age. Joint appearance is overall normal.  LYMPHATIC: There are no enlarged nodes.  SKIN/HAIR/NAILS: Skin color is normal.  There are no skin lesions.  Hair and nails are normal.  NEUROLOGIC: The patient is alert and oriented to person, place, time, and circumstance. Speech is normal. Cranial nerves are normal. Motor strength is normal for age. The patient is normally coordinated.  PSYCHIATRIC:  Mood and affect are normal and the patient has normal recent and remote memory. The patient's judgment and insight are normal.      RECTAL: Deferred.  Already done by his urologist.  GENITAL/URINARY: Circumcised penis,  descended testis, no scrotal mass or swelling, no inguinal mass.     Additional Information        Baldomero Coyle MD  Internal Medicine  Contact me at [phone]

## 2021-06-16 PROBLEM — E11.9 DIABETES MELLITUS, TYPE 2 (H): Status: ACTIVE | Noted: 2018-04-17

## 2021-06-16 PROBLEM — N40.1 BENIGN PROSTATIC HYPERPLASIA WITH LOWER URINARY TRACT SYMPTOMS, SYMPTOM DETAILS UNSPECIFIED: Status: ACTIVE | Noted: 2018-01-11

## 2021-06-17 NOTE — PROGRESS NOTES
Office Visit - Follow Up   Jamil Trejo   63 y.o. male    Date of Visit: 4/17/2018    Chief Complaint   Patient presents with     Follow-up     fasting, wants to discuss changes to medications        Assessment and Plan   1. Diabetes mellitus, type 2  Not ideally controlled during his last visit because he did not take his metformin yet.  A1c on 1/11/2018 was 7.8 with a fasting blood sugar of 178.  Started taking metformin in early January.  Continue metformin 500 mg twice a day.  Check A1c and basic metabolic panel.  - Glycosylated Hemoglobin A1c  - Basic Metabolic Panel    2. Hyperlipidemia  Controlled.  Continue simvastatin.  Lipids on 1/11/2018 showed , HDL 32, triglycerides 155 and total cholesterol 168.  Check fasting lipids.  - Lipid Cascade    3. Essential hypertension  Controlled.  Continue lisinopril and chlorthalidone.    4.  BPH  Followed by urology, Dr. Carter.  Was seen in November 2017.  Was advised to see him again in 6 months.  Has lower urinary symptoms of frequency and hesitancy.  Will see urology in May or June 2018.  PSA on  his last visit was normal at 1.6.  Continue to follow with urology.      Follow up in 6 months.       History of Present Illness   This 63 y.o. old male is here for follow-up.  Has hypertension controlled by combination of lisinopril and chlorthalidone.  Has diabetes.  Not controlled because he only started taking metformin early this year.  Has hyperlipidemia controlled by simvastatin.  Has BPH with lower urinary tract symptoms of frequency and hesitancy.  Followed by urology.  Was recommended during his last visit to follow-up in 6 months if his BPH symptoms gets worse.  Reports that his thinking of having BPH surgery in May or June.  Overall feels well.  No other complaints.    Review of Systems   A 12 point comprehensive review of systems was negative except as noted..     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        Past Medical History   Patient Active Problem List   Diagnosis     Cerumen Impaction     Nephrolithiasis     BPH (benign prostatic hyperplasia)     Hyperlipidemia     Essential Hypertension     Gastritis     Hyperglycemia     Benign Polyps Of The Large Intestine     Male Erectile Disorder     Benign prostatic hyperplasia with lower urinary tract symptoms, symptom details unspecified     Diabetes mellitus, type 2       Past Surgical History  He has a past surgical history that includes pr removal of sperm duct(s).       Medications and Allergies   Current Outpatient Prescriptions   Medication Sig     lisinopril (PRINIVIL,ZESTRIL) 10 MG tablet Take 1 tablet (10 mg total) by mouth daily.     metFORMIN (GLUCOPHAGE) 500 MG tablet Take 1 tablet (500 mg total) by mouth 2 (two) times a day with meals.     sildenafil (VIAGRA) 50 MG tablet Take 1 tablet (50 mg total) by mouth daily as needed (one hour before intercoursebe).     simvastatin (ZOCOR) 20 MG tablet TAKE ONE TABLET (20MG TOTAL) BY MOUTH AT BEDTIME     tamsulosin (FLOMAX) 0.4 mg Cp24 Take 2 capsules (0.8 mg total) by mouth daily.     VIT A/VIT C/VIT E/ZINC/COPPER (PRESERVISION AREDS ORAL) Take by mouth daily.     No Known Allergies     Family and Social History   Family History   Problem Relation Age of Onset     No Medical Problems Mother      No Medical Problems Father      No Medical Problems Sister         Social History   Substance Use Topics     Smoking status: Never Smoker     Smokeless tobacco: Never Used     Alcohol use No        Physical Exam       Physical Exam  /70  Pulse 60  Ht 5' 4\" (1.626 m)  Wt 194 lb (88 kg)  SpO2 97%  BMI 33.3 kg/m2  General appearance: alert, appears stated age, " cooperative and no distress  Head: Normocephalic, without obvious abnormality, atraumatic  Throat: lips, mucosa, and tongue normal; teeth and gums normal  Neck: no adenopathy, no carotid bruit, no JVD, supple, symmetrical, trachea midline and thyroid not enlarged, symmetric, no tenderness/mass/nodules  Lungs: clear to auscultation bilaterally  Heart: regular rate and rhythm, S1, S2 normal, no murmur, click, rub or gallop  Abdomen: soft, non-tender; bowel sounds normal; no masses,  no organomegaly  Extremities: extremities normal, atraumatic, no cyanosis or edema  Skin: Skin color, texture, turgor normal. No rashes or lesions  Neurologic: Grossly normal     Additional Information        Baldomero Coyle MD  Internal Medicine  Contact me at [phone]     Additional Information   Current Outpatient Prescriptions   Medication Sig     lisinopril (PRINIVIL,ZESTRIL) 10 MG tablet Take 1 tablet (10 mg total) by mouth daily.     metFORMIN (GLUCOPHAGE) 500 MG tablet Take 1 tablet (500 mg total) by mouth 2 (two) times a day with meals.     sildenafil (VIAGRA) 50 MG tablet Take 1 tablet (50 mg total) by mouth daily as needed (one hour before intercoursebe).     simvastatin (ZOCOR) 20 MG tablet TAKE ONE TABLET (20MG TOTAL) BY MOUTH AT BEDTIME     tamsulosin (FLOMAX) 0.4 mg Cp24 Take 2 capsules (0.8 mg total) by mouth daily.     VIT A/VIT C/VIT E/ZINC/COPPER (PRESERVISION AREDS ORAL) Take by mouth daily.     No Known Allergies  Social History   Substance Use Topics     Smoking status: Never Smoker     Smokeless tobacco: Never Used     Alcohol use No         Time: total time spent with the patient was 25 minutes of which >50% was spent in counseling and coordination of care

## 2021-06-18 NOTE — ANESTHESIA PREPROCEDURE EVALUATION
Anesthesia Evaluation      Patient summary reviewed   No history of anesthetic complications     Airway   Mallampati: I  Neck ROM: full   Pulmonary - negative ROS and normal exam                          Cardiovascular - negative ROS and normal exam  (+) hypertension well controlled, , hypercholesterolemia,      Neuro/Psych - negative ROS     Endo/Other - negative ROS   (+) diabetes mellitus type 2 well controlled,      GI/Hepatic/Renal - negative ROS           Dental - normal exam                        Anesthesia Plan  Planned anesthetic: general endotracheal    ASA 2   Induction: intravenous   Anesthetic plan and risks discussed with: patient and spouse    Post-op plan: routine recovery

## 2021-06-18 NOTE — PROGRESS NOTES
Preoperative Exam    Scheduled Procedure: CYSTOSCOPY, TRANSURETHRAL RESECTION PROSTATE  Surgery Date:  6/28/18  Surgery Location: [facility], fax [phone]    Surgeon:  Dr. Rickey Carter     Assessment/Plan:     1. Preop exam for internal medicine  Okay to go ahead with his procedure.  No absolute contraindications.  - Electrocardiogram Perform and Read    2. BPH (benign prostatic hyperplasia)  Has BPH with lower urinary tract symptoms of urgency, nocturia and slowing urinary stream.  Saw his urologist Dr. Carter.  Found to have BPH with  LUTS.  Recommended cystoscopy and TURP.    3. Diabetes mellitus type 2 in obese  Has diabetes mellitus, type 2.  A1c is improved to 6.6 with metformin.  Will hold metformin on the morning of his procedure.  - Glycosylated Hemoglobin A1c  - Basic Metabolic Panel    4. Essential hypertension  Controlled.  Continue lisinopril.  Take lisinopril with sips of water in the morning of surgery.  - HM2(CBC w/o Differential)    5. Hyperlipidemia  Not controlled.  Declined to take a lipid-lowering medication.  Last lipids on 4/17/2018 were increased.  Tries to control his hyperlipidemia with lifestyle changes.  Has lost 13 pounds since last visit by eating healthy.  - Lipid Cascade     Surgical Procedure Risk: Low (reported cardiac risk generally < 1%)  Have you had prior anesthesia?: Yes  Have you or any family members had a previous anesthesia reaction:  No  Do you or any family members have a history of a clotting or bleeding disorder?: No  Cardiac Risk Assessment: no increased risk for major cardiac complications    Patient approved for surgery with general or local anesthesia.      Functional Status: Independent  Patient plans to recover at home with family.     Subjective:      Jamil Trejo is a 63 y.o. male who presents for a preoperative consultation.      63-year-old male for preop history and physical requested by his urologist, Dr. Carter prior to his cystoscopy and  TURP for BPH with  LUTS  on 6/28/2018 at [facility].  Complains of slow urinary stream, nocturia occurring 2-3 times a night and some urgency.  Saw urology.  Found to have BPH.  Recommended to have cystoscopy with TURP.  Has hypertension controlled by lisinopril.  Has hyperlipidemia not controlled because he declines to take a lipid-lowering medication.  Trying to control his lipids diet.  Last lipids were increased.  Has diabetes.  Controlled by metformin.  Last A1c was good.  Denies chest pains and shortness of breath.  Denies bowel changes or disturbances.  Denies lightheadedness and dizziness.  Feels well.    All other systems reviewed and are negative, other than those listed in the HPI.    Pertinent History  Do you have difficulty breathing or chest pain after walking up a flight of stairs: No  History of obstructive sleep apnea: No  Steroid use in the last 6 months: No  Frequent Aspirin/NSAID use: No  Prior Blood Transfusion: No  Prior Blood Transfusion Reaction: No  If for some reason prior to, during or after the procedure, if it is medically indicated, would you be willing to have a blood transfusion?:  There is no transfusion refusal.    Current Outpatient Prescriptions   Medication Sig Dispense Refill     lisinopril (PRINIVIL,ZESTRIL) 10 MG tablet Take 1 tablet (10 mg total) by mouth daily. 90 tablet 3     metFORMIN (GLUCOPHAGE) 500 MG tablet Take 1 tablet (500 mg total) by mouth 2 (two) times a day with meals. 180 tablet 3     sildenafil (VIAGRA) 50 MG tablet Take 1 tablet (50 mg total) by mouth daily as needed (one hour before intercoursebe). 5 tablet 6     tamsulosin (FLOMAX) 0.4 mg Cp24 Take 2 capsules (0.8 mg total) by mouth daily. 180 capsule 3     VIT A/VIT C/VIT E/ZINC/COPPER (PRESERVISION AREDS ORAL) Take by mouth daily.       No current facility-administered medications for this visit.         No Known Allergies    Patient Active Problem List   Diagnosis     Cerumen Impaction      "Nephrolithiasis     BPH (benign prostatic hyperplasia)     Hyperlipidemia     Essential Hypertension     Gastritis     Hyperglycemia     Benign Polyps Of The Large Intestine     Male Erectile Disorder     Benign prostatic hyperplasia with lower urinary tract symptoms, symptom details unspecified     Diabetes mellitus, type 2       Past Medical History:   Diagnosis Date     BPH (benign prostatic hyperplasia)      Hyperlipidemia      Hypertension        Past Surgical History:   Procedure Laterality Date     DE REMOVAL OF SPERM DUCT(S)      Description: Surgery Of Male Genitalia Vasectomy;  Recorded: 10/28/2009;  Comments: 2001.       Social History     Social History     Marital status:      Spouse name: N/A     Number of children: N/A     Years of education: N/A     Occupational History     Not on file.     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       Patient Care Team:  Baldomero Coyle MD as PCP - General          Objective:     Vitals:    06/04/18 1349   BP: 116/80   Pulse: 77   SpO2: 96%   Weight: 181 lb (82.1 kg)   Height: 5' 4\" (1.626 m)         Physical Exam:  /80  Pulse 77  Ht 5' 4\" (1.626 m)  Wt 181 lb (82.1 kg)  SpO2 96%  BMI 31.07 kg/m2    General Appearance:    Alert, cooperative, no distress, appears stated age, pleasant    and comfortable   Head:    Normocephalic, without obvious abnormality, atraumatic   Eyes:    PERRL, conjunctiva/corneas clear, EOM's intact, fundi     benign, both eyes        Ears:    Normal TM's and external ear canals, both ears   Nose:   Nares normal, septum midline, mucosa normal, no drainage    or sinus tenderness   Throat:   Lips, mucosa, and tongue " normal; teeth and gums normal   Neck:   Supple, symmetrical, trachea midline, no adenopathy;        thyroid:  No enlargement/tenderness/nodules; no carotid    bruit or JVD   Back:     Symmetric, no curvature, ROM normal, no CVA tenderness   Lungs:     Clear to auscultation bilaterally, respirations unlabored   Chest wall:    No tenderness or deformity   Heart:    Regular rate and rhythm, S1 and S2 normal, no murmur, rub    or gallop   Abdomen:     Soft, non-tender, bowel sounds active all four quadrants,     no masses, no organomegaly   Rectal:    per urology exam,  has BPH   Extremities:   Extremities normal, atraumatic, no cyanosis or edema   Pulses:   2+ and symmetric all extremities   Skin:   Skin color, texture, turgor normal, no rashes or lesions   Lymph nodes:   Cervical, supraclavicular, and axillary nodes normal   Neurologic:   CNII-XII intact. Normal strength, sensation and reflexes       throughout   ,    There are no Patient Instructions on file for this visit.    EKG: Normal ECG, 6/4/2018    Labs:  Labs pending at this time.  Results will be reviewed when available.    Immunization History   Administered Date(s) Administered     Influenza [id number], 11/10/2009     Influenza, inj, historic,unspecified 10/14/2009, 09/21/2015, 09/30/2016     Influenza, seasonal,quad inj 36+ mos 10/01/2017     Pneumo Conj 13-V (2010&after) 01/11/2018     Td, adult adsorbed, PF 01/11/2018     Tdap 11/02/2008     ZOSTER, LIVE 01/10/2017           Electronically signed by Baldomero Coyle MD 06/04/18 1:51 PM

## 2021-06-19 NOTE — ANESTHESIA CARE TRANSFER NOTE
Last vitals:   Vitals:    06/28/18 1310   BP: 136/55   Pulse: 68   Resp: 15   Temp: 36.2  C (97.1  F)   SpO2: 100%     Patient's level of consciousness is drowsy  Spontaneous respirations: yes  Maintains airway independently: yes  Dentition unchanged: yes  Oropharynx: oropharynx clear of all foreign objects    QCDR Measures:  ASA# 20 - Surgical Safety Checklist: WHO surgical safety checklist completed prior to induction  PQRS# 430 - Adult PONV Prevention: 4558F - Pt received => 2 anti-emetic agents (different classes) preop & intraop  ASA# 8 - Peds PONV Prevention: NA - Not pediatric patient, not GA or 2 or more risk factors NOT present  PQRS# 424 - Stacey-op Temp Management: 4559F - At least one body temp DOCUMENTED => 35.5C or 95.9F within required timeframe  PQRS# 426 - PACU Transfer Protocol: - Transfer of care checklist used  ASA# 14 - Acute Post-op Pain: ASA14B - Patient did NOT experience pain >= 7 out of 10

## 2021-06-19 NOTE — LETTER
Letter by Baldomero Coyle MD at      Author: Baldomero Coyle MD Service: -- Author Type: --    Filed:  Encounter Date: 4/22/2019 Status: (Other)         Jamil Trejo  [address]  [place] [address]             April 22, 2019         Dear Mr. Trejo,    Below are the results from your recent visit:    Resulted Orders   HM2(CBC w/o Differential)   Result Value Ref Range    WBC 6.3 4.0 - 11.0 thou/uL    RBC 5.39 4.40 - 6.20 mill/uL    Hemoglobin 17.0 14.0 - 18.0 g/dL    Hematocrit 48.8 40.0 - 54.0 %    MCV 91 80 - 100 fL    MCH 31.6 27.0 - 34.0 pg    MCHC 34.8 32.0 - 36.0 g/dL    RDW 11.8 11.0 - 14.5 %    Platelets 183 140 - 440 thou/uL    MPV 8.3 7.0 - 10.0 fL   Lipid Cascade   Result Value Ref Range    Cholesterol 167 <=199 mg/dL    Triglycerides 226 (H) <=149 mg/dL    HDL Cholesterol 34 (L) >=40 mg/dL    LDL Calculated 88 <=129 mg/dL    Patient Fasting > 8hrs? Yes    Glycosylated Hemoglobin A1c   Result Value Ref Range    Hemoglobin A1c 6.4 (H) 3.5 - 6.0 %   Thyroid Stimulating Hormone (TSH)   Result Value Ref Range    TSH 0.68 0.30 - 5.00 uIU/mL   Urinalysis-UC if Indicated   Result Value Ref Range    Color, UA Yellow Colorless, Yellow, Straw, Light Yellow    Clarity, UA Clear Clear    Glucose, UA Negative Negative    Bilirubin, UA Negative Negative    Ketones, UA Negative Negative    Specific Gravity, UA 1.025 1.005 - 1.030    Blood, UA Negative Negative    pH, UA 5.5 5.0 - 8.0    Protein, UA Negative Negative mg/dL    Urobilinogen, UA 0.2 E.U./dL 0.2 E.U./dL, 1.0 E.U./dL    Nitrite, UA Negative Negative    Leukocytes, UA Negative Negative    Narrative    Microscopic not indicated  UC not indicated   Hepatic Profile   Result Value Ref Range    Bilirubin, Total 0.7 0.0 - 1.0 mg/dL    Bilirubin, Direct 0.2 <=0.5 mg/dL    Protein, Total 7.8 6.0 - 8.0 g/dL    Albumin 4.2 3.5 - 5.0 g/dL    Alkaline Phosphatase 63 45 - 120 U/L    AST 23 0 - 40 U/L    ALT 31 0 - 45 U/L   Basic Metabolic Panel    Result Value Ref Range    Sodium 139 136 - 145 mmol/L    Potassium 4.2 3.5 - 5.0 mmol/L    Chloride 106 98 - 107 mmol/L    CO2 25 22 - 31 mmol/L    Anion Gap, Calculation 8 5 - 18 mmol/L    Glucose 121 70 - 125 mg/dL    Calcium 10.0 8.5 - 10.5 mg/dL    BUN 13 8 - 22 mg/dL    Creatinine 0.84 0.70 - 1.30 mg/dL    GFR MDRD Af Amer >60 >60 mL/min/1.73m2    GFR MDRD Non Af Amer >60 >60 mL/min/1.73m2    Narrative    Fasting Glucose reference range is 70-99 mg/dL per  American Diabetes Association (ADA) guidelines.   Microalbumin, Random Urine   Result Value Ref Range    Microalbumin, Random Urine 1.44 0.00 - 1.99 mg/dL    Creatinine, Urine 137.8 mg/dL    Microalbumin/Creatinine Ratio Random Urine 10.4 <=19.9 mg/g    Narrative    Microalbumin, Random Urine  <2.0 mg/dL . . . . . . . . Normal  3.0-30.0 mg/dL . . . . . . Microalbuminuria  >30.0 mg/dL . . . . . .  . Clinical Proteinuria  Microalbumin/Creatinine Ratio, Random Urine  <20 mg/g . . . . .. . . . Normal   mg/g . . . . . . . Microalbuminuria  >300 mg/g . . . . . . . . Clinical Proteinuria       Good labs!    Please call with questions or contact us using [employer].    Sincerely,        Electronically signed by Baldomero Coyle MD

## 2021-06-19 NOTE — ANESTHESIA POSTPROCEDURE EVALUATION
Patient: Jamil Trejo  CYSTOSCOPY, TRANSURETHRAL RESECTION PROSTATE  Anesthesia type: general    Patient location: PACU  Last vitals:   Vitals:    06/28/18 1536   BP: 105/58   Pulse: (!) 56   Resp: 16   Temp: 36.5  C (97.7  F)   SpO2: 95%     Post vital signs: stable  Level of consciousness: awake and responds to simple questions  Post-anesthesia pain: pain controlled  Post-anesthesia nausea and vomiting: no  Pulmonary: unassisted, return to baseline  Cardiovascular: stable and blood pressure at baseline  Hydration: adequate  Anesthetic events: no    QCDR Measures:  ASA# 11 - Stacey-op Cardiac Arrest: ASA11B - Patient did NOT experience unanticipated cardiac arrest  ASA# 12 - Stacey-op Mortality Rate: ASA12B - Patient did NOT die  ASA# 13 - PACU Re-Intubation Rate: ASA13B - Patient did NOT require a new airway mgmt  ASA# 10 - Composite Anes Safety: ASA10A - No serious adverse event    Additional Notes:

## 2021-06-19 NOTE — LETTER
Letter by Baldomero Coyle MD at      Author: Baldomero Coyle MD Service: -- Author Type: --    Filed:  Encounter Date: 11/27/2019 Status: Signed         Jamil Trejo  [address]  [place] [address]             November 27, 2019         Dear Mr. Trejo,    Below are the results from your recent visit:    Resulted Orders   Lipid Cascade   Result Value Ref Range    Cholesterol 156 <=199 mg/dL    Triglycerides 189 (H) <=149 mg/dL    HDL Cholesterol 35 (L) >=40 mg/dL    LDL Calculated 83 <=129 mg/dL    Patient Fasting > 8hrs? Yes    Glycosylated Hemoglobin A1c   Result Value Ref Range    Hemoglobin A1c 6.9 (H) 3.5 - 6.0 %   Basic Metabolic Panel   Result Value Ref Range    Sodium 142 136 - 145 mmol/L    Potassium 4.5 3.5 - 5.0 mmol/L    Chloride 108 (H) 98 - 107 mmol/L    CO2 21 (L) 22 - 31 mmol/L    Anion Gap, Calculation 13 5 - 18 mmol/L    Glucose 149 (H) 70 - 125 mg/dL    Calcium 9.6 8.5 - 10.5 mg/dL    BUN 15 8 - 22 mg/dL    Creatinine 0.89 0.70 - 1.30 mg/dL    GFR MDRD Af Amer >60 >60 mL/min/1.73m2    GFR MDRD Non Af Amer >60 >60 mL/min/1.73m2    Narrative    Fasting Glucose reference range is 70-99 mg/dL per  American Diabetes Association (ADA) guidelines.   Hepatic Profile   Result Value Ref Range    Bilirubin, Total 0.9 0.0 - 1.0 mg/dL    Bilirubin, Direct 0.3 <=0.5 mg/dL    Protein, Total 7.6 6.0 - 8.0 g/dL    Albumin 4.2 3.5 - 5.0 g/dL    Alkaline Phosphatase 55 45 - 120 U/L    AST 22 0 - 40 U/L    ALT 36 0 - 45 U/L       Overall, good labs!    Please call with questions or contact us using [employer].    Sincerely,        Electronically signed by Baldomero Coyle MD

## 2021-06-20 ENCOUNTER — HEALTH MAINTENANCE LETTER (OUTPATIENT)
Age: 66
End: 2021-06-20

## 2021-06-20 NOTE — PROGRESS NOTES
Office Visit - Follow Up   Jamil Trejo   63 y.o. male    Date of Visit: 9/18/2018    Chief Complaint   Patient presents with     Follow-up     DM check up - Fasting for lab work         Assessment and Plan   1. Diabetes mellitus, type 2 (H)  Controlled.  A1c on 648 and was 6.4.  Continue metformin 500 mg twice a day.  Due for eye exam.  Will schedule his eye appointment with his eye doctor by himself.  Check A1c and basic metabolic panel.  - Glycosylated Hemoglobin A1c  - Basic Metabolic Panel  - metFORMIN (GLUCOPHAGE) 500 MG tablet; Take 1 tablet (500 mg total) by mouth 2 (two) times a day with meals.  Dispense: 180 tablet; Refill: 3    2. Essential hypertension  Controlled.  Continue lisinopril.  Wants to cut down lisinopril from 5 mg to 10 mg daily since he is showing low blood pressure readings in the 100 systolic.  Okay to decrease his lisinopril to 5 mg.  Check CBC.  - lisinopril (PRINIVIL,ZESTRIL) 5 MG tablet; Take 1 tablet (5 mg total) by mouth daily.  Dispense: 90 tablet; Refill: 3  - HM2(CBC w/o Differential)    3. Hyperlipidemia   not ideally controlled because he did not take his atorvastatin when his lipids were checked in June.  Lipids showed LDL of 164, HDL 34, triglyceride 226 and total cholesterol 243.  After I discussed his last lipids results he resumed taking  simvastatin 20 mg daily.  Fasting today.  Check fasting lipids and liver function.  - Lipid Cascade  - Hepatic Profile  - simvastatin (ZOCOR) 20 MG tablet; Take 1 tablet (20 mg total) by mouth at bedtime.  Dispense: 90 tablet; Refill: 3    4. Benign prostatic hyperplasia with lower urinary tract symptoms, symptom details unspecified  Has BPH.  Status post TURP on 6/28/2018 by Dr. Carter of urology.  Denies  erectile dysfunction, urinary incontinence and other urinary symptoms post TURP.  Was  followed post surgery by Dr. Carter in August.  Was advised to see him again in 6 months.    Reviewed surgery and operative notes by Dr. Arias  in June t.  Reviewed follow-up visit notes by Dr. Carter in August.  I discussed these with the patient.      Follow up in 6 months.     History of Present Illness   This 63 y.o. old male is here for follow-up.  He underwent TURP for BPH on June 28, 2018.  Tolerated this procedure without any complication.  Now denies erectile dysfunction, urinary incontinence and other urinary symptoms.  was seen by Dr. Carter, his urologist in August for post surgery follow-up.  Was advised to see him  in 6 months with repeat PSA.  Has hypertension.  Reports his systolic blood pressure is getting low with lisinopril 10 mg daily.  Wants to decrease his lisinopril to 5 mg daily.  Has hyperlipidemia.  Not controlled because he did not take his simvastatin was this was checked in June.  After knowing his increased lipids he resume taking his simvastatin.  Has diabetes.  Controlled by metformin.  Last A1c was good.  Overall feels well.  No other complaints.  But remains obese having BMI of 31.9.    Review of Systems   A 12 point comprehensive review of systems was negative except as noted..     Medications, Allergies and Problem List   Reviewed and updated             Chief Complaint   Follow-up (DM check up - Fasting for lab work )       Patient Profile   Social History     Social History Narrative    , 1 daughter, 17 years old. 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 Non alcohol drinker.        Past Medical History   Patient Active Problem List   Diagnosis     Nephrolithiasis     Hyperlipidemia     Essential Hypertension     Benign Polyps Of The Large Intestine     Male Erectile Disorder     Benign prostatic hyperplasia with lower urinary tract symptoms, symptom details unspecified     Diabetes mellitus, type 2 (H)       Past Surgical History  He has a past surgical history that includes pr removal of sperm duct(s) and pr transurethral elec-surg prostatectom (N/A, 6/28/2018).       Medications and Allergies   Current  "Outpatient Prescriptions   Medication Sig     metFORMIN (GLUCOPHAGE) 500 MG tablet Take 1 tablet (500 mg total) by mouth 2 (two) times a day with meals.     simvastatin (ZOCOR) 20 MG tablet Take 1 tablet (20 mg total) by mouth at bedtime.     vitamin A-vitamin C-vit E-min (OCUVITE) Tab tablet Take 1 tablet by mouth at bedtime.     lisinopril (PRINIVIL,ZESTRIL) 5 MG tablet Take 1 tablet (5 mg total) by mouth daily.     No Known Allergies     Family and Social History   Family History   Problem Relation Age of Onset     No Medical Problems Mother      No Medical Problems Father      No Medical Problems Sister         Social History   Substance Use Topics     Smoking status: Never Smoker     Smokeless tobacco: Never Used     Alcohol use No        Physical Exam       Physical Exam  /66 (Patient Site: Right Arm, Patient Position: Sitting, Cuff Size: Adult Large)  Pulse 60  Ht 5' 4\" (1.626 m)  Wt 186 lb (84.4 kg)  SpO2 98%  BMI 31.93 kg/m2  General appearance: alert, appears stated age, cooperative and no distress  Head: Normocephalic, without obvious abnormality, atraumatic  Throat: lips, mucosa, and tongue normal; teeth and gums normal  Neck: no adenopathy, no carotid bruit, no JVD, supple, symmetrical, trachea midline and thyroid not enlarged, symmetric, no tenderness/mass/nodules  Lungs: clear to auscultation bilaterally  Heart: regular rate and rhythm, S1, S2 normal, no murmur, click, rub or gallop  Abdomen: soft, non-tender; bowel sounds normal; no masses,  no organomegaly  Extremities: extremities normal, atraumatic, no cyanosis or edema  Skin: Skin color, texture, turgor normal. No rashes or lesions  Neurologic: Grossly normal  Musculoskeletal: Normal exam     Additional Information        Baldomero Coyle MD  Internal Medicine  Contact me at [phone]     Additional Information   Current Outpatient Prescriptions   Medication Sig     metFORMIN (GLUCOPHAGE) 500 MG tablet Take 1 tablet (500 mg total) " by mouth 2 (two) times a day with meals.     simvastatin (ZOCOR) 20 MG tablet Take 1 tablet (20 mg total) by mouth at bedtime.     vitamin A-vitamin C-vit E-min (OCUVITE) Tab tablet Take 1 tablet by mouth at bedtime.     lisinopril (PRINIVIL,ZESTRIL) 5 MG tablet Take 1 tablet (5 mg total) by mouth daily.     No Known Allergies  Social History   Substance Use Topics     Smoking status: Never Smoker     Smokeless tobacco: Never Used     Alcohol use No         Time: total time spent with the patient was 40 minutes of which >50% was spent in counseling and coordination of care

## 2021-07-16 ENCOUNTER — TRANSFERRED RECORDS (OUTPATIENT)
Dept: HEALTH INFORMATION MANAGEMENT | Facility: CLINIC | Age: 66
End: 2021-07-16

## 2021-07-16 LAB — RETINOPATHY: NEGATIVE

## 2021-10-11 ENCOUNTER — HEALTH MAINTENANCE LETTER (OUTPATIENT)
Age: 66
End: 2021-10-11

## 2022-01-30 ENCOUNTER — HEALTH MAINTENANCE LETTER (OUTPATIENT)
Age: 67
End: 2022-01-30

## 2022-04-23 DIAGNOSIS — I10 HTN, GOAL BELOW 140/90: ICD-10-CM

## 2022-04-23 DIAGNOSIS — E11.9 TYPE 2 DIABETES MELLITUS WITHOUT COMPLICATION, WITHOUT LONG-TERM CURRENT USE OF INSULIN (H): ICD-10-CM

## 2022-04-23 DIAGNOSIS — E78.5 HYPERLIPIDEMIA LDL GOAL <100: ICD-10-CM

## 2022-04-25 RX ORDER — SIMVASTATIN 20 MG
20 TABLET ORAL AT BEDTIME
Qty: 90 TABLET | Refills: 0 | Status: SHIPPED | OUTPATIENT
Start: 2022-04-25 | End: 2022-08-25

## 2022-04-25 RX ORDER — LISINOPRIL 5 MG/1
5 TABLET ORAL DAILY
Qty: 90 TABLET | Refills: 0 | Status: SHIPPED | OUTPATIENT
Start: 2022-04-25 | End: 2022-08-25

## 2022-04-25 NOTE — TELEPHONE ENCOUNTER
Pending Prescriptions:                       Disp   Refills    simvastatin (ZOCOR) 20 MG tablet [Pharmacy*90 tab*4        Sig: TAKE 1 TABLET(20 MG) BY MOUTH AT BEDTIME    lisinopril (ZESTRIL) 5 MG tablet [Pharmacy*90 tab*4        Sig: TAKE 1 TABLET(5 MG) BY MOUTH DAILY

## 2022-05-24 ENCOUNTER — TRANSFERRED RECORDS (OUTPATIENT)
Dept: HEALTH INFORMATION MANAGEMENT | Facility: CLINIC | Age: 67
End: 2022-05-24

## 2022-07-19 ENCOUNTER — TRANSFERRED RECORDS (OUTPATIENT)
Dept: INTERNAL MEDICINE | Facility: CLINIC | Age: 67
End: 2022-07-19

## 2022-07-19 LAB — RETINOPATHY: NEGATIVE

## 2022-08-25 DIAGNOSIS — E78.5 HYPERLIPIDEMIA LDL GOAL <100: ICD-10-CM

## 2022-08-25 DIAGNOSIS — I10 HTN, GOAL BELOW 140/90: ICD-10-CM

## 2022-08-25 DIAGNOSIS — E11.9 TYPE 2 DIABETES MELLITUS WITHOUT COMPLICATION, WITHOUT LONG-TERM CURRENT USE OF INSULIN (H): ICD-10-CM

## 2022-08-25 RX ORDER — SIMVASTATIN 20 MG
20 TABLET ORAL AT BEDTIME
Qty: 90 TABLET | Refills: 4 | Status: SHIPPED | OUTPATIENT
Start: 2022-08-25 | End: 2022-11-03

## 2022-08-25 RX ORDER — LISINOPRIL 5 MG/1
5 TABLET ORAL DAILY
Qty: 90 TABLET | Refills: 4 | Status: SHIPPED | OUTPATIENT
Start: 2022-08-25 | End: 2022-11-03

## 2022-09-24 ENCOUNTER — HEALTH MAINTENANCE LETTER (OUTPATIENT)
Age: 67
End: 2022-09-24

## 2022-10-30 ASSESSMENT — ACTIVITIES OF DAILY LIVING (ADL): CURRENT_FUNCTION: NO ASSISTANCE NEEDED

## 2022-10-30 ASSESSMENT — ENCOUNTER SYMPTOMS
NERVOUS/ANXIOUS: 0
COUGH: 0
PARESTHESIAS: 0
PALPITATIONS: 0
FREQUENCY: 0
HEMATOCHEZIA: 0
DIZZINESS: 0
HEADACHES: 0
ARTHRALGIAS: 0
JOINT SWELLING: 0
MYALGIAS: 0
DYSURIA: 0
EYE PAIN: 0
HEMATURIA: 0
WEAKNESS: 0
CONSTIPATION: 0
SORE THROAT: 0
CHILLS: 0
FEVER: 0
HEARTBURN: 0
DIARRHEA: 0
SHORTNESS OF BREATH: 0
ABDOMINAL PAIN: 0
NAUSEA: 0

## 2022-11-01 ENCOUNTER — OFFICE VISIT (OUTPATIENT)
Dept: INTERNAL MEDICINE | Facility: CLINIC | Age: 67
End: 2022-11-01
Payer: MEDICARE

## 2022-11-01 VITALS
DIASTOLIC BLOOD PRESSURE: 80 MMHG | TEMPERATURE: 96.7 F | BODY MASS INDEX: 33.27 KG/M2 | WEIGHT: 193.8 LBS | SYSTOLIC BLOOD PRESSURE: 137 MMHG | RESPIRATION RATE: 18 BRPM | OXYGEN SATURATION: 96 % | HEART RATE: 80 BPM

## 2022-11-01 DIAGNOSIS — D12.6 ADENOMATOUS POLYP OF COLON, UNSPECIFIED PART OF COLON: ICD-10-CM

## 2022-11-01 DIAGNOSIS — I10 HTN, GOAL BELOW 140/90: ICD-10-CM

## 2022-11-01 DIAGNOSIS — E11.9 TYPE 2 DIABETES MELLITUS WITHOUT COMPLICATION, WITHOUT LONG-TERM CURRENT USE OF INSULIN (H): ICD-10-CM

## 2022-11-01 DIAGNOSIS — E78.5 HYPERLIPIDEMIA LDL GOAL <100: ICD-10-CM

## 2022-11-01 DIAGNOSIS — Z00.00 ROUTINE GENERAL MEDICAL EXAMINATION AT A HEALTH CARE FACILITY: Primary | ICD-10-CM

## 2022-11-01 LAB
ALBUMIN SERPL-MCNC: 4 G/DL (ref 3.4–5)
ALP SERPL-CCNC: 67 U/L (ref 40–150)
ALT SERPL W P-5'-P-CCNC: 64 U/L (ref 0–70)
ANION GAP SERPL CALCULATED.3IONS-SCNC: 5 MMOL/L (ref 3–14)
AST SERPL W P-5'-P-CCNC: 36 U/L (ref 0–45)
BILIRUB SERPL-MCNC: 0.6 MG/DL (ref 0.2–1.3)
BUN SERPL-MCNC: 12 MG/DL (ref 7–30)
CALCIUM SERPL-MCNC: 9.1 MG/DL (ref 8.5–10.1)
CHLORIDE BLD-SCNC: 108 MMOL/L (ref 94–109)
CHOLEST SERPL-MCNC: 118 MG/DL
CO2 SERPL-SCNC: 25 MMOL/L (ref 20–32)
CREAT SERPL-MCNC: 0.75 MG/DL (ref 0.66–1.25)
CREAT UR-MCNC: 203 MG/DL
ERYTHROCYTE [DISTWIDTH] IN BLOOD BY AUTOMATED COUNT: 12.2 % (ref 10–15)
FASTING STATUS PATIENT QL REPORTED: YES
GFR SERPL CREATININE-BSD FRML MDRD: >90 ML/MIN/1.73M2
GLUCOSE BLD-MCNC: 174 MG/DL (ref 70–99)
HBA1C MFR BLD: 8 % (ref 0–5.6)
HCT VFR BLD AUTO: 48.2 % (ref 40–53)
HDLC SERPL-MCNC: 33 MG/DL
HGB BLD-MCNC: 16.6 G/DL (ref 13.3–17.7)
LDLC SERPL CALC-MCNC: 57 MG/DL
MCH RBC QN AUTO: 31.2 PG (ref 26.5–33)
MCHC RBC AUTO-ENTMCNC: 34.4 G/DL (ref 31.5–36.5)
MCV RBC AUTO: 91 FL (ref 78–100)
MICROALBUMIN UR-MCNC: 30 MG/L
MICROALBUMIN/CREAT UR: 14.78 MG/G CR (ref 0–17)
NONHDLC SERPL-MCNC: 85 MG/DL
PLATELET # BLD AUTO: 199 10E3/UL (ref 150–450)
POTASSIUM BLD-SCNC: 4.5 MMOL/L (ref 3.4–5.3)
PROT SERPL-MCNC: 7.6 G/DL (ref 6.8–8.8)
RBC # BLD AUTO: 5.32 10E6/UL (ref 4.4–5.9)
SODIUM SERPL-SCNC: 138 MMOL/L (ref 133–144)
TRIGL SERPL-MCNC: 138 MG/DL
WBC # BLD AUTO: 5.3 10E3/UL (ref 4–11)

## 2022-11-01 PROCEDURE — 36415 COLL VENOUS BLD VENIPUNCTURE: CPT | Performed by: INTERNAL MEDICINE

## 2022-11-01 PROCEDURE — 83036 HEMOGLOBIN GLYCOSYLATED A1C: CPT | Performed by: INTERNAL MEDICINE

## 2022-11-01 PROCEDURE — G0438 PPPS, INITIAL VISIT: HCPCS | Performed by: INTERNAL MEDICINE

## 2022-11-01 PROCEDURE — 80053 COMPREHEN METABOLIC PANEL: CPT | Performed by: INTERNAL MEDICINE

## 2022-11-01 PROCEDURE — 82043 UR ALBUMIN QUANTITATIVE: CPT | Performed by: INTERNAL MEDICINE

## 2022-11-01 PROCEDURE — 80061 LIPID PANEL: CPT | Performed by: INTERNAL MEDICINE

## 2022-11-01 PROCEDURE — 85027 COMPLETE CBC AUTOMATED: CPT | Performed by: INTERNAL MEDICINE

## 2022-11-01 PROCEDURE — 99207 PR FOOT EXAM NO CHARGE: CPT | Performed by: INTERNAL MEDICINE

## 2022-11-01 ASSESSMENT — ENCOUNTER SYMPTOMS
PARESTHESIAS: 0
PALPITATIONS: 0
ABDOMINAL PAIN: 0
CHILLS: 0
DIARRHEA: 0
WEAKNESS: 0
FEVER: 0
HEMATURIA: 0
HEARTBURN: 0
SORE THROAT: 0
SHORTNESS OF BREATH: 0
COUGH: 0
NERVOUS/ANXIOUS: 0
EYE PAIN: 0
ARTHRALGIAS: 0
CONSTIPATION: 0
HEADACHES: 0
NAUSEA: 0
JOINT SWELLING: 0
FREQUENCY: 0
DIZZINESS: 0
DYSURIA: 0
MYALGIAS: 0
HEMATOCHEZIA: 0

## 2022-11-01 ASSESSMENT — PAIN SCALES - GENERAL: PAINLEVEL: NO PAIN (0)

## 2022-11-01 ASSESSMENT — ACTIVITIES OF DAILY LIVING (ADL): CURRENT_FUNCTION: NO ASSISTANCE NEEDED

## 2022-11-01 NOTE — PROGRESS NOTES
Dr Bangura's note    Patient's instructions / PLAN:                                                        Plan:  1.  Labs today - [address]       ASSESSMENT & PLAN:                                                      (Z00.00) Routine general medical examination at a health care facility  (primary encounter diagnosis)  Comment:   Plan:     (E11.9) DM 2 (H)  Comment:   Plan: FOOT EXAM            (I10) HTN, goal below 140/90  Comment:   Plan:     (E78.5) Hyperlipidemia LDL goal <100  Comment:   Plan:     (D12.6) Adenomatous polyp of colon -- needs colonoscopy 2024  Comment:   Plan:        Chief Complaint:                                                      Annual exam      SUBJECTIVE:                                                    History of present illness     We reviewed the chronic medical problems as above.   I reviewed the recent tests results in Epic     He feels well. No ac c/o     ROS:     See below        PMHx: - reviewed  Past Medical History:   Diagnosis Date     Diabetes mellitus, type 2 (H)      Hyperlipidemia      Hypertension          PSHx: reviewed  Past Surgical History:   Procedure Laterality Date     HC TRANSURETHRAL ELEC-SURG PROSTATECTOM N/A 6/28/2018    Procedure: CYSTOSCOPY, TRANSURETHRAL RESECTION PROSTATE;  Surgeon: Rickey Carter MD;  Location: [facility];  Service: Urology     REMOVAL OF SPERM DUCT(S)      Description: Surgery Of Male Genitalia Vasectomy;  Recorded: 10/28/2009;  Comments: 2001.     TURP  2018        Soc Hx: No daily alcohol, no smoking  Social History     Socioeconomic History     Marital status:      Spouse name: Not on file     Number of children: Not on file     Years of education: Not on file     Highest education level: Not on file   Occupational History     Not on file   Tobacco Use     Smoking status: Never     Passive exposure: Never     Smokeless tobacco: Never   Vaping Use     Vaping Use: Never used   Substance and Sexual Activity      Alcohol use: Not Currently     Drug use: Never     Sexual activity: Not Currently   Other Topics Concern     Not on file   Social History Narrative     Not on file     Social Determinants of Health     Financial Resource Strain: Not on file   Food Insecurity: Not on file   Transportation Needs: Not on file   Physical Activity: Not on file   Stress: Not on file   Social Connections: Not on file   Intimate Partner Violence: Not on file   Housing Stability: Not on file        Fam Hx: reviewed  Family History   Problem Relation Age of Onset     No Known Problems Mother      No Known Problems Father      No Known Problems Sister          Screening: reviewed    All: reviewed    Meds: reviewed  Current Outpatient Medications   Medication Sig Dispense Refill     lisinopril (ZESTRIL) 5 MG tablet Take 1 tablet (5 mg) by mouth daily 90 tablet 4     metFORMIN (GLUCOPHAGE) 1000 MG tablet Take 1 tablet (1,000 mg) by mouth 2 times daily (with meals) 90 tablet 4     simvastatin (ZOCOR) 20 MG tablet Take 1 tablet (20 mg) by mouth At Bedtime 90 tablet 4           OBJECTIVE:                                                    Physical Exam :    Blood pressure 137/80, pulse 80, temperature (!) 96.7  F (35.9  C), temperature source Tympanic, resp. rate 18, weight 87.9 kg (193 lb 12.8 oz), SpO2 96 %.     NAD, appears comfortable  Skin clear, no rashes  Neck: supple, no JVD,  no thyroidmegaly  Lymph nodes non palpable in the cervical, supraclavicular axillaries,   Chest: clear to auscultation with good respiratory effort  Cardiac: S1S2, RRR, no mgr appreciated  Abdomen: soft, not tender, not distended, audible bowel sound, no hepatosplenomegaly, no palpable masses, no abdominal bruits  Extremities:. No cyanosis, clubbing No edema. Good pedal pulses. No skin lesions. Monofilament skin sensation is intact   Neuro: A, Ox3, no focal signs.      Fabiana Bangura MD  Internal Medicine      SUBJECTIVE:   Jamil is a 67 year old who presents for  "Preventive Visit.      Patient has been advised of split billing requirements and indicates understanding: Yes  Are you in the first 12 months of your Medicare coverage?  No    Healthy Habits:     In general, how would you rate your overall health?  Good    Frequency of exercise:  1 day/week    Duration of exercise:  Less than 15 minutes    Do you usually eat at least 4 servings of fruit and vegetables a day, include whole grains    & fiber and avoid regularly eating high fat or \"junk\" foods?  Yes    Taking medications regularly:  Yes    Medication side effects:  None    Ability to successfully perform activities of daily living:  No assistance needed    Home Safety:  No safety concerns identified    Hearing Impairment:  No hearing concerns    In the past 6 months, have you been bothered by leaking of urine? Yes    In general, how would you rate your overall mental or emotional health?  Good      PHQ-2 Total Score: 0    Additional concerns today:  No    Do you feel safe in your environment? Yes    Have you ever done Advance Care Planning? (For example, a Health Directive, POLST, or a discussion with a medical provider or your loved ones about your wishes): No, advance care planning information given to patient to review.  Patient declined advance care planning discussion at this time.       Fall risk  Fallen 2 or more times in the past year?: No  Any fall with injury in the past year?: No    Cognitive Screening   1) Repeat 3 items (Leader, Season, Table)    2) Clock draw: NORMAL  3) 3 item recall: Recalls 3 objects  Results: 3 items recalled: COGNITIVE IMPAIRMENT LESS LIKELY    Mini-CogTM Copyright LIVAN Shook. Licensed by the author for use in [facility]; reprinted with permission (bulmaro@.[facility]). All rights reserved.      Do you have sleep apnea, excessive snoring or daytime drowsiness?: no    Reviewed and updated as needed this visit by clinical staff                  Reviewed and updated as needed this " visit by Provider                 Social History     Tobacco Use     Smoking status: Never     Passive exposure: Never     Smokeless tobacco: Never   Substance Use Topics     Alcohol use: Not Currently         Alcohol Use 10/30/2022   Prescreen: >3 drinks/day or >7 drinks/week? No           Hyperlipidemia Follow-Up      Are you regularly taking any medication or supplement to lower your cholesterol?   Yes- Simvastatin    Are you having muscle aches or other side effects that you think could be caused by your cholesterol lowering medication?  No    Hypertension Follow-up      Do you check your blood pressure regularly outside of the clinic? Yes     Are you following a low salt diet? No    Are your blood pressures ever more than 140 on the top number (systolic) OR more   than 90 on the bottom number (diastolic), for example 140/90? No      Current providers sharing in care for this patient include:   Patient Care Team:  Fabiana Valencia MD as PCP - General (Internal Medicine)  Fabiana Valencia MD as Assigned PCP    The following health maintenance items are reviewed in Epic and correct as of today:  Health Maintenance   Topic Date Due     ANNUAL REVIEW OF HM ORDERS  Never done     MEDICARE ANNUAL WELLNESS VISIT  04/17/2020     A1C  04/19/2021     BMP  10/19/2021     LIPID  10/19/2021     MICROALBUMIN  10/19/2021     DIABETIC FOOT EXAM  10/30/2021     COVID-19 Vaccine (4 - Booster for Pfizer series) 11/22/2021     INFLUENZA VACCINE (1) 09/01/2022     EYE EXAM  07/19/2023     FALL RISK ASSESSMENT  11/01/2023     ADVANCE CARE PLANNING  11/01/2027     DTAP/TDAP/TD IMMUNIZATION (3 - Td or Tdap) 01/11/2028     COLORECTAL CANCER SCREENING  12/16/2029     HEPATITIS C SCREENING  Completed     PHQ-2 (once per calendar year)  Completed     Pneumococcal Vaccine: 65+ Years  Completed     ZOSTER IMMUNIZATION  Completed     AORTIC ANEURYSM SCREENING (SYSTEM ASSIGNED)  Completed     IPV IMMUNIZATION   "Aged Out     MENINGITIS IMMUNIZATION  Aged Out     Labs reviewed in EPIC          Review of Systems   Constitutional: Negative for chills and fever.   HENT: Negative for congestion, ear pain, hearing loss and sore throat.    Eyes: Negative for pain and visual disturbance.   Respiratory: Negative for cough and shortness of breath.    Cardiovascular: Negative for chest pain, palpitations and peripheral edema.   Gastrointestinal: Negative for abdominal pain, constipation, diarrhea, heartburn, hematochezia and nausea.   Genitourinary: Positive for impotence. Negative for dysuria, frequency, genital sores, hematuria, penile discharge and urgency.   Musculoskeletal: Negative for arthralgias, joint swelling and myalgias.   Skin: Negative for rash.   Neurological: Negative for dizziness, weakness, headaches and paresthesias.   Psychiatric/Behavioral: Negative for mood changes. The patient is not nervous/anxious.          Patient has been advised of split billing requirements and indicates understanding: Yes At the check in, at the        COUNSELING:  Reviewed preventive health counseling, as reflected in patient instructions    Estimated body mass index is 32.1 kg/m  as calculated from the following:    Height as of 11/20/20: 1.626 m (5' 4\").    Weight as of 11/20/20: 84.8 kg (187 lb).    Weight management plan: Discussed healthy diet and exercise guidelines    He reports that he has never smoked. He has never been exposed to tobacco smoke. He has never used smokeless tobacco.      Appropriate preventive services were discussed with this patient, including applicable screening as appropriate for cardiovascular disease, diabetes, osteopenia/osteoporosis, and glaucoma.  As appropriate for age/gender, discussed screening for colorectal cancer, prostate cancer, breast cancer, and cervical cancer. Checklist reviewing preventive services available has been given to the patient.    Reviewed patients plan of care and " provided an AVS. The Basic Care Plan (routine screening as documented in Health Maintenance) for Jamil meets the Care Plan requirement. This Care Plan has been established and reviewed with the Patient.    Counseling Resources:  ATP IV Guidelines  Pooled Cohorts Equation Calculator  Breast Cancer Risk Calculator  Breast Cancer: Medication to Reduce Risk  FRAX Risk Assessment  ICSI Preventive Guidelines  Dietary Guidelines for Americans, 2010  [employer] A Kid!'s MyPlate  ASA Prophylaxis  Lung CA Screening    Fabiana Valencia MD  [facility]    Identified Health Risks:

## 2022-11-03 DIAGNOSIS — I10 HTN, GOAL BELOW 140/90: ICD-10-CM

## 2022-11-03 DIAGNOSIS — E78.5 HYPERLIPIDEMIA LDL GOAL <100: ICD-10-CM

## 2022-11-03 DIAGNOSIS — E11.9 TYPE 2 DIABETES MELLITUS WITHOUT COMPLICATION, WITHOUT LONG-TERM CURRENT USE OF INSULIN (H): ICD-10-CM

## 2022-11-03 RX ORDER — SIMVASTATIN 20 MG
20 TABLET ORAL AT BEDTIME
Qty: 90 TABLET | Refills: 4 | Status: SHIPPED | OUTPATIENT
Start: 2022-11-03 | End: 2023-09-29

## 2022-11-03 RX ORDER — LISINOPRIL 5 MG/1
5 TABLET ORAL DAILY
Qty: 90 TABLET | Refills: 4 | Status: SHIPPED | OUTPATIENT
Start: 2022-11-03 | End: 2023-08-28

## 2022-11-07 DIAGNOSIS — E11.9 TYPE 2 DIABETES MELLITUS WITHOUT COMPLICATION, WITHOUT LONG-TERM CURRENT USE OF INSULIN (H): Primary | ICD-10-CM

## 2023-03-17 ENCOUNTER — E-VISIT (OUTPATIENT)
Dept: INTERNAL MEDICINE | Facility: CLINIC | Age: 68
End: 2023-03-17
Payer: MEDICARE

## 2023-03-17 DIAGNOSIS — I10 HTN, GOAL BELOW 140/90: Primary | ICD-10-CM

## 2023-03-17 PROCEDURE — 99207 PR NON-BILLABLE SERV PER CHARTING: CPT | Performed by: INTERNAL MEDICINE

## 2023-03-31 ENCOUNTER — LAB (OUTPATIENT)
Dept: LAB | Facility: CLINIC | Age: 68
End: 2023-03-31
Payer: MEDICARE

## 2023-03-31 DIAGNOSIS — I10 HTN, GOAL BELOW 140/90: ICD-10-CM

## 2023-03-31 DIAGNOSIS — E11.9 TYPE 2 DIABETES MELLITUS WITHOUT COMPLICATION, WITHOUT LONG-TERM CURRENT USE OF INSULIN (H): ICD-10-CM

## 2023-03-31 LAB
ANION GAP SERPL CALCULATED.3IONS-SCNC: 11 MMOL/L (ref 7–15)
BUN SERPL-MCNC: 13 MG/DL (ref 8–23)
CALCIUM SERPL-MCNC: 9.4 MG/DL (ref 8.8–10.2)
CHLORIDE SERPL-SCNC: 106 MMOL/L (ref 98–107)
CREAT SERPL-MCNC: 0.87 MG/DL (ref 0.67–1.17)
DEPRECATED HCO3 PLAS-SCNC: 23 MMOL/L (ref 22–29)
GFR SERPL CREATININE-BSD FRML MDRD: >90 ML/MIN/1.73M2
GLUCOSE SERPL-MCNC: 180 MG/DL (ref 70–99)
HBA1C MFR BLD: 7.4 % (ref 0–5.6)
POTASSIUM SERPL-SCNC: 4.7 MMOL/L (ref 3.4–5.3)
SODIUM SERPL-SCNC: 140 MMOL/L (ref 136–145)

## 2023-03-31 PROCEDURE — 80048 BASIC METABOLIC PNL TOTAL CA: CPT

## 2023-03-31 PROCEDURE — 36415 COLL VENOUS BLD VENIPUNCTURE: CPT

## 2023-03-31 PROCEDURE — 83036 HEMOGLOBIN GLYCOSYLATED A1C: CPT

## 2023-04-05 ENCOUNTER — TRANSFERRED RECORDS (OUTPATIENT)
Dept: HEALTH INFORMATION MANAGEMENT | Facility: CLINIC | Age: 68
End: 2023-04-05

## 2023-04-07 ENCOUNTER — MYC MEDICAL ADVICE (OUTPATIENT)
Dept: INTERNAL MEDICINE | Facility: CLINIC | Age: 68
End: 2023-04-07
Payer: MEDICARE

## 2023-04-07 DIAGNOSIS — I10 HTN, GOAL BELOW 140/90: ICD-10-CM

## 2023-04-07 DIAGNOSIS — E11.9 TYPE 2 DIABETES MELLITUS WITHOUT COMPLICATION, WITHOUT LONG-TERM CURRENT USE OF INSULIN (H): Primary | ICD-10-CM

## 2023-04-14 DIAGNOSIS — E11.9 TYPE 2 DIABETES MELLITUS WITHOUT COMPLICATION, WITHOUT LONG-TERM CURRENT USE OF INSULIN (H): ICD-10-CM

## 2023-04-18 NOTE — TELEPHONE ENCOUNTER
Pending Prescriptions:                       Disp   Refills    metFORMIN (GLUCOPHAGE) 1000 MG tablet     90 tab*1            Sig: Take 1 tablet (1,000 mg) by mouth 2 times daily           (with meals)    Prescription approved per [facility] Refill Protocol.

## 2023-07-06 ENCOUNTER — LAB (OUTPATIENT)
Dept: LAB | Facility: CLINIC | Age: 68
End: 2023-07-06
Payer: MEDICARE

## 2023-07-06 DIAGNOSIS — E11.9 TYPE 2 DIABETES MELLITUS WITHOUT COMPLICATION, WITHOUT LONG-TERM CURRENT USE OF INSULIN (H): ICD-10-CM

## 2023-07-06 DIAGNOSIS — I10 HTN, GOAL BELOW 140/90: ICD-10-CM

## 2023-07-06 LAB
ANION GAP SERPL CALCULATED.3IONS-SCNC: 12 MMOL/L (ref 7–15)
BUN SERPL-MCNC: 14 MG/DL (ref 8–23)
CALCIUM SERPL-MCNC: 9.6 MG/DL (ref 8.8–10.2)
CHLORIDE SERPL-SCNC: 106 MMOL/L (ref 98–107)
CREAT SERPL-MCNC: 0.82 MG/DL (ref 0.67–1.17)
DEPRECATED HCO3 PLAS-SCNC: 21 MMOL/L (ref 22–29)
GFR SERPL CREATININE-BSD FRML MDRD: >90 ML/MIN/1.73M2
GLUCOSE SERPL-MCNC: 173 MG/DL (ref 70–99)
HBA1C MFR BLD: 7.3 % (ref 0–5.6)
POTASSIUM SERPL-SCNC: 4.6 MMOL/L (ref 3.4–5.3)
SODIUM SERPL-SCNC: 139 MMOL/L (ref 136–145)

## 2023-07-06 PROCEDURE — 36415 COLL VENOUS BLD VENIPUNCTURE: CPT

## 2023-07-06 PROCEDURE — 80048 BASIC METABOLIC PNL TOTAL CA: CPT

## 2023-07-06 PROCEDURE — 83036 HEMOGLOBIN GLYCOSYLATED A1C: CPT

## 2023-08-28 ENCOUNTER — TRANSFERRED RECORDS (OUTPATIENT)
Dept: HEALTH INFORMATION MANAGEMENT | Facility: CLINIC | Age: 68
End: 2023-08-28
Payer: MEDICARE

## 2023-08-28 DIAGNOSIS — E78.5 HYPERLIPIDEMIA LDL GOAL <100: ICD-10-CM

## 2023-08-28 DIAGNOSIS — E11.9 TYPE 2 DIABETES MELLITUS WITHOUT COMPLICATION, WITHOUT LONG-TERM CURRENT USE OF INSULIN (H): ICD-10-CM

## 2023-08-28 DIAGNOSIS — I10 HTN, GOAL BELOW 140/90: ICD-10-CM

## 2023-08-28 RX ORDER — SIMVASTATIN 20 MG
20 TABLET ORAL AT BEDTIME
Qty: 90 TABLET | Refills: 4 | OUTPATIENT
Start: 2023-08-28

## 2023-08-28 RX ORDER — LISINOPRIL 5 MG/1
5 TABLET ORAL DAILY
Qty: 90 TABLET | Refills: 0 | Status: SHIPPED | OUTPATIENT
Start: 2023-08-28 | End: 2023-09-29

## 2023-09-14 ENCOUNTER — TRANSFERRED RECORDS (OUTPATIENT)
Dept: HEALTH INFORMATION MANAGEMENT | Facility: CLINIC | Age: 68
End: 2023-09-14
Payer: MEDICARE

## 2023-09-14 LAB — RETINOPATHY: NEGATIVE

## 2023-09-22 NOTE — COMMUNITY RESOURCES LIST (ENGLISH)
09/22/2023   [facility] [place]  N/A  For questions about this resource list or additional care needs, please contact your primary care clinic or care manager.  Phone: [phone]   Email: N/A   Address: [address] [place] [address]   Hours: N/A        Hotlines and Helplines       Hotline - Housing crisis  1  [facility] (Main Office) - Emergency Services Distance: 14.49 miles      Phone/Virtual   [address] [place] [address]  Language: English  Hours: Mon - Sun Open 24 Hours   Phone: (336) 134-8111 Email: info@[facility].org Website: http://[facility].org     2  [facility] Distance: 16.39 miles      Phone/Virtual   [address] [place] [address]  Language: English  Hours: Mon - Sun Open 24 Hours   Phone: (621) 494-9471 Email: communications@[facility].org Website: https://[facility]-mn.org/oursaviourshousing/          Housing       Coordinated Entry access point  3  [facility] Distance: 9.41 miles      In-Person, Phone/Virtual   [address] [place] [address]  Language: English, Cypriot  Hours: Mon - Fri 8:30 AM - 4:30 PM  Fees: Free   Phone: (386) 467-2654 Email: info@[facility].org Website: https://www.[facility].org/locations/downACMH Hospital-clinic/     4  [facility] ([facility] - Housing Services Distance: 16.5 miles      In-Person   [address] [place] [address]  Language: English  Hours: Mon - Fri 9:00 AM - 5:00 PM  Fees: Free   Phone: (115) 172-3611 Email: housing@[facility].org Website: http://www.[facility].org/housing     Drop-in center or day shelter  5  [facility] Distance: 9.21 miles      In-Person   [address] [place] [address]  Language: English  Hours: Mon - Fri 9:00 AM - 4:00 PM  Fees: Free   Phone: (808) 963-4439 Email: luisitok@[employer].org Website: http://listeninghouse.org     6  [facility] [place] [facility] [place] [facility] - [facility] [place] [facility] Distance: 9.48 miles      In-Person   [address] [place] [address]  Language: English  Hours: Mon - Sun 9:00 AM - 5:30 PM  Fees: Free, Self Pay   Phone: (817) 264-8192 Email: info@[employer] Website: https://www.[employer]/locations/[facility]-[place]/     Housing search assistance  7  [facility] Distance: 5.04 miles      In-Person, Phone/Virtual   [address] [place] [address]  Language: English  Hours: Mon - Fri 8:00 AM - 4:30 PM  Fees: Free   Phone: (129) 632-5074 Email: humanresourLenda@[employer] Website: http://[employer]     8  [facility] - Online Housing Search Assistance Distance: 9.81 miles      Phone/Virtual   [address] [place] [address]  Language: English  Hours: Mon - Sun Open 24 Hours  Fees: Free   Phone: (110) 427-3970 Email: markos@[employer] Website: https://[facility] SystemAOTMP/     Shelter for families  9  [facility] Distance: 21.25 miles      In-Person   [address] [place] [address]  Language: English  Hours: Mon - Fri 3:00 PM - 9:00 AM , Sat - Sun Open 24 Hours  Fees: Free   Phone: (373) 296-3763 Ext.1 Website: https://www.[facility]s.org/2020/07/03/emergency-family-shelter/     Shelter for individuals  10  [facility] - [facility] [place] [facility] Distance: 9.48 miles      In-Person   [address] [place] [address]  Language: English  Hours: Mon - Sun 5:00 PM - 10:00 AM  Fees: Free, Self Pay   Phone: (295) 418-4843 Email: info@[employer] Website: https://www.[employer]/locations/[facility]-s[facility]-[place]/     11  [facility] Distance: 16.39 miles      In-Person   [address] [place] [address]  Language: English  Hours: Mon - Sun Open 24 Hours  Fees: Free   Phone: (702) 571-7547  Email: communications@[facility]-[place].org Website: https://[facility]-mn.org/oursaviourshousing/          Important Numbers & Websites       Emergency Services   911  [facility] Services   311  Poison Control   (709) 240-8471  Suicide Prevention Lifeline   (464) 528-3557 (TALK)  Child Abuse Hotline   (750) 548-5393 (4-A-Child)  Sexual Assault Hotline   (518) 752-3130 (HOPE)  National Runaway Safeline   (999) 754-5271 (RUNAWAY)  All-Options Talkline   (144) 380-6059  Substance Abuse Referral   (446) 596-7025 (HELP)

## 2023-09-29 ENCOUNTER — OFFICE VISIT (OUTPATIENT)
Dept: INTERNAL MEDICINE | Facility: CLINIC | Age: 68
End: 2023-09-29
Payer: MEDICARE

## 2023-09-29 VITALS
BODY MASS INDEX: 30.85 KG/M2 | RESPIRATION RATE: 18 BRPM | HEART RATE: 78 BPM | SYSTOLIC BLOOD PRESSURE: 119 MMHG | TEMPERATURE: 96.9 F | DIASTOLIC BLOOD PRESSURE: 77 MMHG | OXYGEN SATURATION: 95 % | WEIGHT: 180.7 LBS | HEIGHT: 64 IN

## 2023-09-29 DIAGNOSIS — Z01.818 PRE-OP EXAM: Primary | ICD-10-CM

## 2023-09-29 DIAGNOSIS — I10 HTN, GOAL BELOW 140/90: ICD-10-CM

## 2023-09-29 DIAGNOSIS — E11.9 TYPE 2 DIABETES MELLITUS WITHOUT COMPLICATION, WITHOUT LONG-TERM CURRENT USE OF INSULIN (H): ICD-10-CM

## 2023-09-29 DIAGNOSIS — E78.5 HYPERLIPIDEMIA LDL GOAL <100: ICD-10-CM

## 2023-09-29 LAB
ALBUMIN SERPL BCG-MCNC: 4.7 G/DL (ref 3.5–5.2)
ALP SERPL-CCNC: 69 U/L (ref 40–129)
ALT SERPL W P-5'-P-CCNC: 36 U/L (ref 0–70)
ANION GAP SERPL CALCULATED.3IONS-SCNC: 13 MMOL/L (ref 7–15)
AST SERPL W P-5'-P-CCNC: 28 U/L (ref 0–45)
BILIRUB SERPL-MCNC: 0.7 MG/DL
BUN SERPL-MCNC: 17.3 MG/DL (ref 8–23)
CALCIUM SERPL-MCNC: 9.6 MG/DL (ref 8.8–10.2)
CHLORIDE SERPL-SCNC: 104 MMOL/L (ref 98–107)
CHOLEST SERPL-MCNC: 130 MG/DL
CK SERPL-CCNC: 43 U/L (ref 39–308)
CREAT SERPL-MCNC: 0.86 MG/DL (ref 0.67–1.17)
CREAT UR-MCNC: 129 MG/DL
DEPRECATED HCO3 PLAS-SCNC: 22 MMOL/L (ref 22–29)
EGFRCR SERPLBLD CKD-EPI 2021: >90 ML/MIN/1.73M2
ERYTHROCYTE [DISTWIDTH] IN BLOOD BY AUTOMATED COUNT: 12.3 % (ref 10–15)
GLUCOSE SERPL-MCNC: 130 MG/DL (ref 70–99)
HBA1C MFR BLD: 6.6 % (ref 0–5.6)
HCT VFR BLD AUTO: 48.3 % (ref 40–53)
HDLC SERPL-MCNC: 30 MG/DL
HGB BLD-MCNC: 16.6 G/DL (ref 13.3–17.7)
LDLC SERPL CALC-MCNC: 74 MG/DL
MCH RBC QN AUTO: 31.1 PG (ref 26.5–33)
MCHC RBC AUTO-ENTMCNC: 34.4 G/DL (ref 31.5–36.5)
MCV RBC AUTO: 90 FL (ref 78–100)
MICROALBUMIN UR-MCNC: <12 MG/L
MICROALBUMIN/CREAT UR: NORMAL MG/G{CREAT}
NONHDLC SERPL-MCNC: 100 MG/DL
PLATELET # BLD AUTO: 177 10E3/UL (ref 150–450)
POTASSIUM SERPL-SCNC: 4.4 MMOL/L (ref 3.4–5.3)
PROT SERPL-MCNC: 8.1 G/DL (ref 6.4–8.3)
RBC # BLD AUTO: 5.34 10E6/UL (ref 4.4–5.9)
SODIUM SERPL-SCNC: 139 MMOL/L (ref 135–145)
TRIGL SERPL-MCNC: 129 MG/DL
TSH SERPL DL<=0.005 MIU/L-ACNC: 1.03 UIU/ML (ref 0.3–4.2)
WBC # BLD AUTO: 5.8 10E3/UL (ref 4–11)

## 2023-09-29 PROCEDURE — 80061 LIPID PANEL: CPT | Performed by: INTERNAL MEDICINE

## 2023-09-29 PROCEDURE — 82570 ASSAY OF URINE CREATININE: CPT | Performed by: INTERNAL MEDICINE

## 2023-09-29 PROCEDURE — 93000 ELECTROCARDIOGRAM COMPLETE: CPT | Performed by: INTERNAL MEDICINE

## 2023-09-29 PROCEDURE — 82550 ASSAY OF CK (CPK): CPT | Performed by: INTERNAL MEDICINE

## 2023-09-29 PROCEDURE — 84443 ASSAY THYROID STIM HORMONE: CPT | Performed by: INTERNAL MEDICINE

## 2023-09-29 PROCEDURE — 99214 OFFICE O/P EST MOD 30 MIN: CPT | Mod: 25 | Performed by: INTERNAL MEDICINE

## 2023-09-29 PROCEDURE — 83036 HEMOGLOBIN GLYCOSYLATED A1C: CPT | Performed by: INTERNAL MEDICINE

## 2023-09-29 PROCEDURE — 82043 UR ALBUMIN QUANTITATIVE: CPT | Performed by: INTERNAL MEDICINE

## 2023-09-29 PROCEDURE — 36415 COLL VENOUS BLD VENIPUNCTURE: CPT | Performed by: INTERNAL MEDICINE

## 2023-09-29 PROCEDURE — 80053 COMPREHEN METABOLIC PANEL: CPT | Performed by: INTERNAL MEDICINE

## 2023-09-29 PROCEDURE — 85027 COMPLETE CBC AUTOMATED: CPT | Performed by: INTERNAL MEDICINE

## 2023-09-29 RX ORDER — LISINOPRIL 5 MG/1
5 TABLET ORAL DAILY
Qty: 90 TABLET | Refills: 4 | Status: SHIPPED | OUTPATIENT
Start: 2023-09-29 | End: 2024-02-29

## 2023-09-29 RX ORDER — SIMVASTATIN 20 MG
20 TABLET ORAL AT BEDTIME
Qty: 90 TABLET | Refills: 4 | Status: SHIPPED | OUTPATIENT
Start: 2023-09-29 | End: 2024-02-29

## 2023-09-29 ASSESSMENT — PAIN SCALES - GENERAL: PAINLEVEL: NO PAIN (0)

## 2023-09-29 NOTE — COMMUNITY RESOURCES LIST (ENGLISH)
09/29/2023   [facility] - Outpatient Clinics  N/A  For additional resource needs, please contact your health insurance member services or your primary care team.  Phone: [phone]   Email: N/A   Address: [address] [place] [address]   Hours: N/A        Hotlines and Helplines       Hotline - Housing crisis  1  [facility] (Main Office) - Emergency Services Distance: 14.49 miles      Phone/Virtual   [address] [place] [address]  Language: English  Hours: Mon - Sun Open 24 Hours   Phone: (616) 887-2821 Email: info@[facility] Website: http://[facility].xkoto     2  [facility] Distance: 16.39 miles      Phone/Virtual   [address] [place] [address]  Language: English  Hours: Mon - Sun Open 24 Hours   Phone: (466) 230-7290 Email: communications@[facility].org Website: https://[facility].org/oursaviourshousing/          Housing       Coordinated Entry access point  3  [facility] Distance: 9.41 miles      In-Person, Phone/Virtual   [address] [place] [address]  Language: English, English  Hours: Mon - Fri 8:30 AM - 4:30 PM  Fees: Free   Phone: (546) 419-6974 Email: info@[facility].org Website: https://www.[facility].org/locations/downtow-clinic/     4  [facility] ([facility] - Housing Services Distance: 16.5 miles      In-Person   [address] [place] [address]  Language: English  Hours: Mon - Fri 9:00 AM - 5:00 PM  Fees: Free   Phone: (482) 364-6653 Email: housing@[facility].org Website: http://www.[facility].org/housing     Drop-in center or day shelter  5  [facility] Distance: 9.21 miles      In-Person   [address] [place] [address]  Language: English  Hours: Mon - Fri 9:00 AM - 4:00 PM  Fees: Free   Phone: (216) 611-1022 Email: jaimie@[employer].org Website: http://listeninghouse.org     6  [facility] [place] [facility] [place] - [facility]  - [facility] [place] [facility] Distance: 9.48 miles      In-Person   [address] [place] [address]  Language: English  Hours: Mon - Sun 9:00 AM - 5:30 PM  Fees: Free, Self Pay   Phone: (547) 760-2294 Email: info@[employer] Website: https://www.[employer]/locations/[facility]-saint-[place]ul/     Housing search assistance  7  [employer] ([employer]) - https://anydooR/ Distance: 17.1 miles      Phone/Virtual   [address] [place] [address]  Language: English  Hours: Mon - Sun Open 24 Hours   Email: info@[facility] Website: https://anydooR     8  [employer] - Online housing search assistance Distance: 18.19 miles      Phone/Virtual   [address] [address]  Language: English, Hmong, Palestinian, Monegasque  Hours: Mon - Sun Open 24 Hours   Phone: (395) 500-3471 Email: info@[employer].org Website: http://www.[employer].org/     Shelter for families  9  [facility] Distance: 21.25 miles      In-Person   [address] [place] [address]  Language: English  Hours: Mon - Fri 3:00 PM - 9:00 AM , Sat - Sun Open 24 Hours  Fees: Free   Phone: (415) 988-6799 Ext.1 Website: https://www.[facility].org/2020/07/03/emergency-family-shelter/     Shelter for individuals  10  [facility] [address] - [facility] [place] [facility] Distance: 9.48 miles      In-Person   [address] [place] [address]  Language: English  Hours: Mon - Sun 5:00 PM - 10:00 AM  Fees: Free, Self Pay   Phone: (348) 607-3249 Email: info@[employer] Website: https://www.[employer]/locations/[facility]-saint-paul/     11  [facility] Distance: 16.39 miles      In-Person   [address] [place] [address]  Language: English  Hours: Mon - Sun Open 24 Hours  Fees: Free   Phone: (354) 531-5158 Email: communications@[facility].org Website:  https://[facility]-mn.org/oursaviourshousing/          Important Numbers & Websites       [facility]   211 211unitedway.org  Poison Control   (767) 840-5676 [place]po[place].org  Suicide and Crisis Lifeline   [address] 98[facility].org  Childhelp [place] Child Abuse Hotline   846.430.2379 Childhelphotline.org  [place] Sexual Assault Hotline   (253) 522-3245 (HOPE) [facility] Runaway Safeline   (310) 510-6809 (RUNAWAY) [place] [facility].org  Pregnancy & Postpartum Support [place]   Call/text 778-274-5531 Ppsupportmn.org  Substance Abuse National Helpline ([facility]   245-549-HELP (1964) Findtreatment.gov  Emergency Services   915

## 2023-09-29 NOTE — PROGRESS NOTES
[place] [address]  [address]  [facility] [address]  Phone: [phone]  Primary Provider: Fabiana Valencia  Pre-op Performing Provider: FABIANA VALENCIA      PREOPERATIVE EVALUATION:  Today's date: 9/29/2023    Jamil is a 68 year old male who presents for a preoperative evaluation.      9/29/2023     7:08 AM   Additional Questions   Roomed by Aurora Cornelius       Surgical Information:  Surgery/Procedure: cataract surgery  Surgery Location: [facility]  Surgeon: Dr. Gupta  Surgery Date: 10/04/2023 & 10/18/2023  Time of Surgery: TBD  Where patient plans to recover: At home with family  Fax number for surgical facility: [phone]            9/22/2023    12:44 PM   Preop Questions   1. Have you ever had a heart attack or stroke? No   2. Have you ever had surgery on your heart or blood vessels, such as a stent placement, a coronary artery bypass, or surgery on an artery in your head, neck, heart, or legs? No   3. Do you have chest pain with activity? No   4. Do you have a history of  heart failure? No   5. Do you currently have a cold, bronchitis or symptoms of other infection? No   6. Do you have a cough, shortness of breath, or wheezing? No   7. Do you or anyone in your family have previous history of blood clots? No   8. Do you or does anyone in your family have a serious bleeding problem such as prolonged bleeding following surgeries or cuts? No   9. Have you ever had problems with anemia or been told to take iron pills? No   10. Have you had any abnormal blood loss such as black, tarry or bloody stools? No   11. Have you ever had a blood transfusion? No   12. Are you willing to have a blood transfusion if it is medically needed before, during, or after your surgery? Yes   13. Have you or any of your relatives ever had problems with anesthesia? No   14. Do you have sleep apnea, excessive snoring or daytime drowsiness? No   15. Do  you have any artifical heart valves or other implanted medical devices like a pacemaker, defibrillator, or continuous glucose monitor? No   16. Do you have artificial joints? No   17. Are you allergic to latex? No       Health Care Directive:  Patient does not have a Health Care Directive or Living Will: Discussed advance care planning with patient; however, patient declined at this time.      CC:  Preop for multiple medical problems.    HPI:    The patient is scheduled for cataract surgery with Dr. Gupta on  Oct 4 and Oct 18, 2023  No other acute complaints.    Assessment:  1. V72.83H Preop general physical exam _ I do not see any major contraindications for the patient to go through the scheduled surgery.    The proposed surgical procedure is considered LOW  surgery risk.    For above listed surgery and anesthesia, Patient is at  MODERATE, risk for surgery/procedure and perioperative/procedure complications.        Cardiovascular risk  Assessment  -- low risk   --  No further cardiac work up is needed before this surgery.     ECG:    sinus rhythm, no changes suggestive for ischemia    Pulmonary Risk Assessment  -- low risk   -- The patient doesn't have chronic lung disease nor acute respiratory problems       Obstructive Sleep Apnea (or suspected sleep apnea)  -- low risk         Anemia Assessment :  -- no anemia - patient doesn't need further anemia work up      Blood Sugar Assessment  -- patient has controlled DM,       Anticoagulation assessment  -- patient does not take anticoagulation meds      (I10) HTN, goal below 140/90  Comment: Controlled    Plan: lisinopril (ZESTRIL) 5 MG tablet, CBC with         platelets, Lipid panel reflex to direct LDL         Fasting, Comprehensive metabolic panel,         Hemoglobin A1c, TSH with free T4 reflex,         Albumin Random Urine Quantitative with Creat         Ratio, CK total            (E11.9) DM 2 (H)  Comment: Controlled    Plan: lisinopril (ZESTRIL) 5 MG tablet,  "metFORMIN         (GLUCOPHAGE) 1000 MG tablet, CBC with         platelets, Lipid panel reflex to direct LDL         Fasting, Comprehensive metabolic panel,         Hemoglobin A1c, TSH with free T4 reflex,         Albumin Random Urine Quantitative with Creat         Ratio, CK total            (E78.5) Hyperlipidemia LDL goal <100  Comment: Controlled    Plan: simvastatin (ZOCOR) 20 MG tablet, CBC with         platelets, Lipid panel reflex to direct LDL         Fasting, Comprehensive metabolic panel,         Hemoglobin A1c, TSH with free T4 reflex,         Albumin Random Urine Quantitative with Creat         Ratio, CK total                 Plan:  1. In the morning of the surgery day you do not take any meds. You resume the meds after the surgery.  2.  Labs today - [address]     Physical exam:    Blood pressure 119/77, pulse 78, temperature 96.9  F (36.1  C), temperature source Tympanic, resp. rate 18, height 1.626 m (5' 4\"), weight 82 kg (180 lb 11.2 oz), SpO2 95 %.   NAD, appears comfortable  Neck: supple, no JVD,  no thyroidmegaly  Lymph nodes non palpable in the cervical, supraclavicular   Chest: clear to auscultation with good respiratory effort  Cardiac: S1S2, RRR, no mgr appreciated  Abdomen: soft, not tender, not distended, audible bowel sound, no hepatosplenomegaly, no palpable masses, no abdominal bruits  Extremities: no cyanosis, clubbing or edema.   Neuro: A, Ox3, no focal signs.        ROS:   as above     Patient Active Problem List   Diagnosis    Nephrolithiasis    Hyperlipidemia    Essential Hypertension    Benign Polyps Of The Large Intestine    Male Erectile Disorder    Benign prostatic hyperplasia with lower urinary tract symptoms, symptom details unspecified    Diabetes mellitus, type 2 (H)    Psychosexual dysfunction with inhibited sexual excitement    Adenomatous polyp of colon -- needs colonoscopy 2024    Hyperlipidemia LDL goal <100    HTN, goal below 140/90        Past Medical History: "   Diagnosis Date    Diabetes mellitus, type 2 (H)     Hyperlipidemia     Hypertension       Past Surgical History:   Procedure Laterality Date     TRANSURETHRAL ELEC-SURG PROSTATECTOM N/A 6/28/2018    Procedure: CYSTOSCOPY, TRANSURETHRAL RESECTION PROSTATE;  Surgeon: Rickey Carter MD;  Location[facility];  Service: Urology    REMOVAL OF SPERM DUCT(S)      Description: Surgery Of Male Genitalia Vasectomy;  Recorded: 10/28/2009;  Comments: 2001.    TURP  2018        PSHx: No complications with prior surgeries or anesthesia     Soc Hx: No daily alcohol, no smoking     Family History   Problem Relation Age of Onset    No Known Problems Mother     No Known Problems Father     No Known Problems Sister         All: reviewed    Meds: reviewed  Current Outpatient Medications   Medication Sig Dispense Refill    lisinopril (ZESTRIL) 5 MG tablet Take 1 tablet (5 mg) by mouth daily 90 tablet 0    metFORMIN (GLUCOPHAGE) 1000 MG tablet Take 1 tablet (1,000 mg) by mouth 2 times daily (with meals) 90 tablet 1    simvastatin (ZOCOR) 20 MG tablet Take 1 tablet (20 mg) by mouth At Bedtime 90 tablet 4            Fabiana Bangura MD  Internal Medicine         Patient Active Problem List    Diagnosis Date Noted    Adenomatous polyp of colon -- needs colonoscopy 2024 11/01/2022     Priority: Medium    Hyperlipidemia LDL goal <100 11/01/2022     Priority: Medium    HTN, goal below 140/90 11/01/2022     Priority: Medium    Diabetes mellitus, type 2 (H) 04/17/2018     Priority: Medium    Hyperlipidemia      Priority: Medium     Created by Conversion        Benign prostatic hyperplasia with lower urinary tract symptoms, symptom details unspecified 01/11/2018     Priority: Medium    Essential Hypertension      Priority: Medium     Created by Conversion  Replacement Utility updated for latest IMO load        Male Erectile Disorder 02/18/2015     Priority: Medium    Psychosexual dysfunction with inhibited sexual excitement  02/18/2015     Priority: Medium    Nephrolithiasis      Priority: Medium     Created by Conversion  [facility] Annotation: Oct 28 2009  1:Baldomero Geronimo: uric acid.   Left kidney.        Benign Polyps Of The Large Intestine      Priority: Medium     Created by Conversion          Past Medical History:   Diagnosis Date    Diabetes mellitus, type 2 (H)     Hyperlipidemia     Hypertension      Past Surgical History:   Procedure Laterality Date    HC TRANSURETHRAL ELEC-SURG PROSTATECTOM N/A 6/28/2018    Procedure: CYSTOSCOPY, TRANSURETHRAL RESECTION PROSTATE;  Surgeon: Rickey Carter MD;  Location: [facility];  Service: Urology    REMOVAL OF SPERM DUCT(S)      Description: Surgery Of Male Genitalia Vasectomy;  Recorded: 10/28/2009;  Comments: 2001.    TURP  2018     Current Outpatient Medications   Medication Sig Dispense Refill    lisinopril (ZESTRIL) 5 MG tablet Take 1 tablet (5 mg) by mouth daily 90 tablet 0    metFORMIN (GLUCOPHAGE) 1000 MG tablet Take 1 tablet (1,000 mg) by mouth 2 times daily (with meals) 90 tablet 1    simvastatin (ZOCOR) 20 MG tablet Take 1 tablet (20 mg) by mouth At Bedtime 90 tablet 4       No Known Allergies     Social History     Tobacco Use    Smoking status: Never     Passive exposure: Never    Smokeless tobacco: Never   Substance Use Topics    Alcohol use: Not Currently         Recent Labs   Lab Test 07/06/23  0739 03/31/23  0759 11/01/22  0756   HGB  --   --  16.6   PLT  --   --  199    140 138   POTASSIUM 4.6 4.7 4.5   CR 0.82 0.87 0.75   A1C 7.3* 7.4* 8.0*          Signed Electronically by: Fabiana Valencia MD  Copy of this evaluation report is provided to requesting physician.

## 2023-09-29 NOTE — COMMUNITY RESOURCES LIST (ENGLISH)
09/29/2023   [facility] [employer]  N/A  For questions about this resource list or additional care needs, please contact your primary care clinic or care manager.  Phone: [phone]   Email: N/A   Address: [address] [place] [address]   Hours: N/A        Hotlines and Helplines       Hotline - Housing crisis  1  [facility] (Main Office) - Emergency Services Distance: 14.49 miles      Phone/Virtual   [address] [place] [address]  Language: English  Hours: Mon - Sun Open 24 Hours   Phone: (720) 820-6056 Email: info@[facility].org Website: http://[facility].org     2  [facility] Distance: 16.39 miles      Phone/Virtual   [address] [place] [address]  Language: English  Hours: Mon - Sun Open 24 Hours   Phone: (791) 400-7836 Email: communications@[facility].org Website: https://[facility]-mn.org/oursaviourshousing/          Housing       Coordinated Entry access point  3  [facility] Distance: 9.41 miles      In-Person, Phone/Virtual   [address] [place] [address]  Language: English, Nauruan  Hours: Mon - Fri 8:30 AM - 4:30 PM  Fees: Free   Phone: (307) 656-3162 Email: info@[facility].org Website: https://www.[facility].org/locations/downSt. Clair Hospital-clinic/     4  [facility] ([facility] - Housing Services Distance: 16.5 miles      In-Person   [address] [place] [address]  Language: English  Hours: Mon - Fri 9:00 AM - 5:00 PM  Fees: Free   Phone: (757) 694-7966 Email: housing@[facility].org Website: http://www.[facility].org/housing     Drop-in center or day shelter  5  [facility] Distance: 9.21 miles      In-Person   [address] [place] [address]  Language: English  Hours: Mon - Fri 9:00 AM - 4:00 PM  Fees: Free   Phone: (170) 329-9815 Email: luisitok@[employer].org Website: http://listeninghouse.org     6  [facility] [place] [facility] [place] - [address] [facility]  [facility] - [facility] [place] [facility] Distance: 9.48 miles      In-Person   [address] [place] [address]  Language: English  Hours: Mon - Sun 9:00 AM - 5:30 PM  Fees: Free, Self Pay   Phone: (688) 647-1501 Email: info@[employer] Website: https://www.[employer]/locations/[facility]-saint-paul/     Housing search assistance  7  [facility] - Homeless Resources and Housing Information Distance: 3.75 miles      In-Person, Phone/Virtual   [address] [place] [address]  Language: English  Hours: Mon - Fri 8:00 AM - 4:30 PM  Fees: Free   Phone: (389) 402-5820 Email: preet@[facility]. Website: https://www.[facility]/Facilities/Facility/Details/[facility]-22     8  [facility] - Housing Resources Distance: 5.04 miles      In-Person, Phone/Virtual   [address] [place] [address]  Language: English  Hours: Mon - Fri 8:00 AM - 4:30 PM  Fees: Free   Phone: (569) 440-6158 Email: office@[employer] Website: http://[employer]aro     Shelter for families  9  [facility] Distance: 21.25 miles      In-Person   [address] [place] [address]  Language: English  Hours: Mon - Fri 3:00 PM - 9:00 AM , Sat - Sun Open 24 Hours  Fees: Free   Phone: (553) 751-1138 Ext.1 Website: https://www.saintandrews.Blockboard/2020/07/03/emergency-family-shelter/     Shelter for individuals  10  [facility] [place] - [address] - [facility] [place] [facility] Distance: 9.48 miles      In-Person   [address] [place] [address]  Language: English  Hours: Mon - Sun 5:00 PM - 10:00 AM  Fees: Free, Self Pay   Phone: (606) 865-6416 Email: info@[employer].org Website: https://www.[facility].org/locations/[facility]-ground-saint-paul/     11  [facility] Distance: 16.39 miles       In-Person   [address] [place] [address]  Language: English  Hours: Mon - Sun Open 24 Hours  Fees: Free   Phone: (763) 257-6601 Email: communications@[facility]-mn.org Website: https://[facility]-mn.org/oursaviourshousing/          Important Numbers & Websites       Emergency Services   [address]  [place] [address]  Poison Control   (355) 531-6100  Suicide Prevention Lifeline   (127) 537-4542 (TALK)  Child Abuse Hotline   (906) 530-6603 (4-A-Child)  Sexual Assault Hotline   (818) 963-2184 (HOPE)  National Runaway Safeline   (160) 419-6609 (RUNAWAY)  All-Options Talkline   (985) 429-2399  Substance Abuse Referral   (258) 533-3945 (HELP)

## 2023-09-29 NOTE — COMMUNITY RESOURCES LIST (ENGLISH)
09/29/2023   [facility] - Outpatient Clinics  N/A  For additional resource needs, please contact your health insurance member services or your primary care team.  Phone: [phone]   Email: N/A   Address: [address] [place] [address]   Hours: N/A        Hotlines and Helplines       Hotline - Housing crisis  1  [facility] (Main Office) - Emergency Services Distance: 14.49 miles      Phone/Virtual   [address] [place] [address]  Language: English  Hours: Mon - Sun Open 24 Hours   Phone: (922) 781-2336 Email: info@[facility] Website: http://[facility].Keypr     2  [facility] Distance: 16.39 miles      Phone/Virtual   [address] [place] [address]  Language: English  Hours: Mon - Sun Open 24 Hours   Phone: (809) 565-9003 Email: communications@[facility].org Website: https://[facility].org/oursaviourshousing/          Housing       Coordinated Entry access point  3  [facility] Distance: 9.41 miles      In-Person, Phone/Virtual   [address] [place] [address]  Language: English, Persian  Hours: Mon - Fri 8:30 AM - 4:30 PM  Fees: Free   Phone: (651) 877-3003 Email: info@[facility].org Website: https://www.[facility].org/locations/[facility]/     4  [facility] ([facility] - Housing Services Distance: 16.5 miles      In-Person   [address] [place] [address]  Language: English  Hours: Mon - Fri 9:00 AM - 5:00 PM  Fees: Free   Phone: (523) 553-7107 Email: housing@[facility].org Website: http://www.[facility].org/housing     Drop-in center or day shelter  5  [facility] Distance: 9.21 miles      In-Person   [address] [place] [address]  Language: English  Hours: Mon - Fri 9:00 AM - 4:00 PM  Fees: Free   Phone: (313) 767-9941 Email: jaimie@[employer].org Website: http://listeninghouse.org     6  [facility]  - [facility] [place] [facility] Distance: 9.48 miles      In-Person   [address] [place] [address]  Language: English  Hours: Mon - Sun 9:00 AM - 5:30 PM  Fees: Free, Self Pay   Phone: (174) 714-2601 Email: info@[facility] Website: https://www.[facility]/locations/[facility]/     Housing search assistance  7  [employer] - https://"[employer]/ Distance: 17.1 miles      Phone/Virtual   [address] [place] [address]  Language: English  Hours: Mon - Sun Open 24 Hours   Email: info@[employer] Website: https://"[employer]     8  [employer] - Online housing search assistance Distance: 18.19 miles      Phone/Virtual   [address] [address]  Language: English, Hmong, French, Angolan  Hours: Mon - Sun Open 24 Hours   Phone: (121) 850-1403 Email: info@[employer].org Website: http://www.[employer].org/     Shelter for families  9  [facility] Distance: 21.25 miles      In-Person   [address] [place] [address]  Language: English  Hours: Mon - Fri 3:00 PM - 9:00 AM , Sat - Sun Open 24 Hours  Fees: Free   Phone: (313) 312-4513 Ext.1 Website: https://www.[facility].org/2020/07/03/emergency-family-shelter/     Shelter for individuals  10  [facility] [place] [facility] Distance: 9.48 miles      In-Person   [address] [place] [address]  Language: English  Hours: Mon - Sun 5:00 PM - 10:00 AM  Fees: Free, Self Pay   Phone: (916) 226-4749 Email: info@[employer] Website: https://www.[employer]/locations/[facility]/     11  [facility] Distance: 16.39 miles      In-Person   [address] [place] [address]  Language: English  Hours: Mon - Sun Open 24 Hours  Fees: Free   Phone: (859) 363-4235 Email: communications@[facility].org Website:  https://[facility].org/oursaviourshousing/          Important Numbers & Websites       [facility]   [address].org  Poison Control   (807) 882-3344 [place]  Suicide and Crisis Lifeline   984 98Critical access hospitalline.org  [facility] [place] Child Abuse Hotline   720.664.3415 Childhelphotline.org  [place] Sexual Assault Hotline   (473) 829-1615 (HOPE) [facility] Runaway Safeline   (311) 320-5447 (RUNAWAY) [facility].org  Pregnancy & Postpartum Support [place]   Call/text 740-302-3708 Ppsupport[place].org  Substance Abuse National Helpline ([facility]   890-337-HELP (0080) Findtreatment.gov  Emergency Services   916

## 2023-09-29 NOTE — PATIENT INSTRUCTIONS
Plan:  1. In the morning of the surgery day you do not take any meds. You resume the meds after the surgery.  2.  Labs today - [address]

## 2023-10-27 ENCOUNTER — PATIENT OUTREACH (OUTPATIENT)
Dept: GASTROENTEROLOGY | Facility: CLINIC | Age: 68
End: 2023-10-27
Payer: MEDICARE

## 2023-12-23 ENCOUNTER — HEALTH MAINTENANCE LETTER (OUTPATIENT)
Age: 68
End: 2023-12-23

## 2024-01-27 ENCOUNTER — TRANSFERRED RECORDS (OUTPATIENT)
Dept: MULTI SPECIALTY CLINIC | Facility: CLINIC | Age: 69
End: 2024-01-27

## 2024-01-27 LAB — RETINOPATHY: NORMAL

## 2024-02-29 ENCOUNTER — MYC REFILL (OUTPATIENT)
Dept: INTERNAL MEDICINE | Facility: CLINIC | Age: 69
End: 2024-02-29
Payer: MEDICARE

## 2024-02-29 DIAGNOSIS — I10 HTN, GOAL BELOW 140/90: ICD-10-CM

## 2024-02-29 DIAGNOSIS — E11.9 TYPE 2 DIABETES MELLITUS WITHOUT COMPLICATION, WITHOUT LONG-TERM CURRENT USE OF INSULIN (H): ICD-10-CM

## 2024-02-29 DIAGNOSIS — E78.5 HYPERLIPIDEMIA LDL GOAL <100: ICD-10-CM

## 2024-02-29 RX ORDER — LISINOPRIL 5 MG/1
5 TABLET ORAL DAILY
Qty: 90 TABLET | Refills: 4 | Status: SHIPPED | OUTPATIENT
Start: 2024-02-29

## 2024-02-29 RX ORDER — SIMVASTATIN 20 MG
20 TABLET ORAL AT BEDTIME
Qty: 90 TABLET | Refills: 4 | Status: SHIPPED | OUTPATIENT
Start: 2024-02-29

## 2024-03-04 ENCOUNTER — LAB (OUTPATIENT)
Dept: LAB | Facility: CLINIC | Age: 69
End: 2024-03-04
Payer: MEDICARE

## 2024-03-04 DIAGNOSIS — E11.9 TYPE 2 DIABETES MELLITUS WITHOUT COMPLICATION, WITHOUT LONG-TERM CURRENT USE OF INSULIN (H): ICD-10-CM

## 2024-03-04 LAB — HBA1C MFR BLD: 6.4 % (ref 0–5.6)

## 2024-03-04 PROCEDURE — 83036 HEMOGLOBIN GLYCOSYLATED A1C: CPT

## 2024-03-04 PROCEDURE — 36415 COLL VENOUS BLD VENIPUNCTURE: CPT

## 2024-07-22 ENCOUNTER — TELEPHONE (OUTPATIENT)
Dept: CARDIOLOGY | Facility: CLINIC | Age: 69
End: 2024-07-22
Payer: MEDICARE

## 2024-07-22 NOTE — TELEPHONE ENCOUNTER
[facility] Call Center    Phone Message    May a detailed message be left on voicemail: yes     Reason for Call: Other: Jamil is requesting that orders for labs and an echo be ordered prior to his appointment with Dr. Heller on 10/25/24.  Please order and call his wife Vivian back to schedule.     Action Taken: Other: Cardiology    Travel Screening: Not Applicable    Thank you!  [facility] Center

## 2024-07-22 NOTE — TELEPHONE ENCOUNTER
Writer returned call to gather more information. No response and VM left to please return call to team 2 nurse line.

## 2024-07-22 NOTE — LETTER
July 31, 2024       TO: Jamil Trejo  [address]  [facility] [address]       On license of [facility], Mr. Trejo,     We were sent a request to plan labs and an echo prior to your visit with Dr. Heller on 10/25/2024.     We cannot order testing on a new patient without authorization from the provider. Dr. Heller will also need a diagnosis to justify testing, otherwise insurance will not approve or pay their portion.     Please call our Team 2 nurse phone at [phone] to discuss your request.     Thank you  Team 2 R.N.s  [facility]

## 2024-07-25 NOTE — TELEPHONE ENCOUNTER
Left messages on both the patient's and his spouse's phone requesting more information about their request for pre-visit testing.    My chart message sent:    Hello, Mr. Trejo,    We were sent a request to plan labs and an echo prior to your visit with Dr. Heller on 10/25/2024.    We cannot order testing on a new patient without authorization from the provider. Dr. Heller will also need a diagnosis to justify testing, otherwise insurance will not approve or pay their portion.    Please call our Team 2 nurse phone at [phone] to discuss your request.    Thank you  Team 2 R.N.s

## 2024-07-31 NOTE — TELEPHONE ENCOUNTER
Patient did not read the my chart message. Letter sent:  Hello, Mr. Trejo,     We were sent a request to plan labs and an echo prior to your visit with Dr. Heller on 10/25/2024.     We cannot order testing on a new patient without authorization from the provider. Dr. Heller will also need a diagnosis to justify testing, otherwise insurance will not approve or pay their portion.     Please call our Team 2 nurse phone at [phone] to discuss your request.     Thank you  Team 2 R.N.s

## 2024-08-13 ENCOUNTER — MYC REFILL (OUTPATIENT)
Dept: INTERNAL MEDICINE | Facility: CLINIC | Age: 69
End: 2024-08-13
Payer: MEDICARE

## 2024-08-13 DIAGNOSIS — E11.9 TYPE 2 DIABETES MELLITUS WITHOUT COMPLICATION, WITHOUT LONG-TERM CURRENT USE OF INSULIN (H): ICD-10-CM

## 2024-08-13 DIAGNOSIS — I10 HTN, GOAL BELOW 140/90: ICD-10-CM

## 2024-08-13 DIAGNOSIS — E78.5 HYPERLIPIDEMIA LDL GOAL <100: ICD-10-CM

## 2024-08-13 RX ORDER — LISINOPRIL 5 MG/1
5 TABLET ORAL DAILY
Qty: 90 TABLET | Refills: 4 | OUTPATIENT
Start: 2024-08-13

## 2024-08-13 RX ORDER — SIMVASTATIN 20 MG
20 TABLET ORAL AT BEDTIME
Qty: 90 TABLET | Refills: 4 | OUTPATIENT
Start: 2024-08-13

## 2024-08-29 ENCOUNTER — TELEPHONE (OUTPATIENT)
Dept: CARDIOLOGY | Facility: CLINIC | Age: 69
End: 2024-08-29
Payer: MEDICARE

## 2024-08-29 NOTE — TELEPHONE ENCOUNTER
Message to Dr. Heller  This is a new patient you are seeing in October   He is a retired MD   His wife is requesting orders for labs/echo prior to your visit with him- OK to order?    She reports that he had stents placed in [place] in May, she will bring copies of the records but they are in Setswana and she will translate       Reply from Dr. Heller:  Jose Heller MD Hiljus, Phyllis MORENO RN; P [facility] Heart Team 2  Since I haven't seen him before I'll wait to order until our visit, not sure what I would want thanks    1005 left a message for patient's spouse of Dr. Heller's reply. Message sent on my chart as follow up.

## 2024-09-03 DIAGNOSIS — E11.9 TYPE 2 DIABETES MELLITUS WITHOUT COMPLICATION, WITHOUT LONG-TERM CURRENT USE OF INSULIN (H): ICD-10-CM

## 2024-09-03 DIAGNOSIS — I10 HTN, GOAL BELOW 140/90: Primary | ICD-10-CM

## 2024-09-03 DIAGNOSIS — E78.5 HYPERLIPIDEMIA LDL GOAL <100: ICD-10-CM

## 2024-09-07 DIAGNOSIS — Z00.00 ROUTINE GENERAL MEDICAL EXAMINATION AT A HEALTH CARE FACILITY: Primary | ICD-10-CM

## 2024-09-07 DIAGNOSIS — Z12.5 SCREENING FOR PROSTATE CANCER: ICD-10-CM

## 2024-09-11 ENCOUNTER — LAB (OUTPATIENT)
Dept: LAB | Facility: CLINIC | Age: 69
End: 2024-09-11
Payer: MEDICARE

## 2024-09-11 DIAGNOSIS — Z12.5 SCREENING FOR PROSTATE CANCER: ICD-10-CM

## 2024-09-11 DIAGNOSIS — I10 HTN, GOAL BELOW 140/90: ICD-10-CM

## 2024-09-11 DIAGNOSIS — E78.5 HYPERLIPIDEMIA LDL GOAL <100: ICD-10-CM

## 2024-09-11 DIAGNOSIS — E11.9 TYPE 2 DIABETES MELLITUS WITHOUT COMPLICATION, WITHOUT LONG-TERM CURRENT USE OF INSULIN (H): ICD-10-CM

## 2024-09-11 LAB
ALBUMIN SERPL BCG-MCNC: 4.6 G/DL (ref 3.5–5.2)
ALP SERPL-CCNC: 53 U/L (ref 40–150)
ALT SERPL W P-5'-P-CCNC: 21 U/L (ref 0–70)
ANION GAP SERPL CALCULATED.3IONS-SCNC: 15 MMOL/L (ref 7–15)
AST SERPL W P-5'-P-CCNC: 23 U/L (ref 0–45)
BILIRUB SERPL-MCNC: 0.5 MG/DL
BUN SERPL-MCNC: 15.7 MG/DL (ref 8–23)
CALCIUM SERPL-MCNC: 9.7 MG/DL (ref 8.8–10.4)
CHLORIDE SERPL-SCNC: 105 MMOL/L (ref 98–107)
CHOLEST SERPL-MCNC: 134 MG/DL
CREAT SERPL-MCNC: 0.91 MG/DL (ref 0.67–1.17)
CREAT UR-MCNC: 144 MG/DL
EGFRCR SERPLBLD CKD-EPI 2021: >90 ML/MIN/1.73M2
ERYTHROCYTE [DISTWIDTH] IN BLOOD BY AUTOMATED COUNT: 12.6 % (ref 10–15)
FASTING STATUS PATIENT QL REPORTED: YES
FASTING STATUS PATIENT QL REPORTED: YES
GLUCOSE SERPL-MCNC: 137 MG/DL (ref 70–99)
HBA1C MFR BLD: 6.6 % (ref 0–5.6)
HCO3 SERPL-SCNC: 19 MMOL/L (ref 22–29)
HCT VFR BLD AUTO: 45.9 % (ref 40–53)
HDLC SERPL-MCNC: 31 MG/DL
HGB BLD-MCNC: 15.7 G/DL (ref 13.3–17.7)
LDLC SERPL CALC-MCNC: 77 MG/DL
MCH RBC QN AUTO: 30.9 PG (ref 26.5–33)
MCHC RBC AUTO-ENTMCNC: 34.2 G/DL (ref 31.5–36.5)
MCV RBC AUTO: 90 FL (ref 78–100)
MICROALBUMIN UR-MCNC: <12 MG/L
MICROALBUMIN/CREAT UR: NORMAL MG/G{CREAT}
NONHDLC SERPL-MCNC: 103 MG/DL
PLATELET # BLD AUTO: 172 10E3/UL (ref 150–450)
POTASSIUM SERPL-SCNC: 4.3 MMOL/L (ref 3.4–5.3)
PROT SERPL-MCNC: 7.7 G/DL (ref 6.4–8.3)
PSA SERPL DL<=0.01 NG/ML-MCNC: 2.17 NG/ML (ref 0–6.5)
RBC # BLD AUTO: 5.08 10E6/UL (ref 4.4–5.9)
SODIUM SERPL-SCNC: 139 MMOL/L (ref 135–145)
TRIGL SERPL-MCNC: 129 MG/DL
TSH SERPL DL<=0.005 MIU/L-ACNC: 1.64 UIU/ML (ref 0.3–4.2)
WBC # BLD AUTO: 5.5 10E3/UL (ref 4–11)

## 2024-09-11 PROCEDURE — 36415 COLL VENOUS BLD VENIPUNCTURE: CPT

## 2024-09-11 PROCEDURE — 83036 HEMOGLOBIN GLYCOSYLATED A1C: CPT

## 2024-09-11 PROCEDURE — 82043 UR ALBUMIN QUANTITATIVE: CPT

## 2024-09-11 PROCEDURE — 82570 ASSAY OF URINE CREATININE: CPT

## 2024-09-11 PROCEDURE — G0103 PSA SCREENING: HCPCS

## 2024-09-11 PROCEDURE — 84443 ASSAY THYROID STIM HORMONE: CPT

## 2024-09-11 PROCEDURE — 85027 COMPLETE CBC AUTOMATED: CPT

## 2024-09-11 PROCEDURE — 80061 LIPID PANEL: CPT

## 2024-09-11 PROCEDURE — 80053 COMPREHEN METABOLIC PANEL: CPT

## 2024-09-16 ENCOUNTER — PATIENT OUTREACH (OUTPATIENT)
Dept: GASTROENTEROLOGY | Facility: CLINIC | Age: 69
End: 2024-09-16
Payer: MEDICARE

## 2024-09-16 NOTE — PROGRESS NOTES
"CRC Screening Colonoscopy Referral Review    Patient meets the inclusion criteria for screening colonoscopy standing order.    Ordering/Referring Provider:  Fabiana Valencia      BMI: Estimated body mass index is 31.02 kg/m  as calculated from the following:    Height as of 9/29/23: 1.626 m (5' 4\").    Weight as of 9/29/23: 82 kg (180 lb 11.2 oz).     Sedation:  Does patient have any of the following conditions affecting sedation?  No medical conditions affecting sedation.    Previous Scopes:  Any previous recommendations or follow up needs based on previous scope?  na / No recommendations.    Medical Concerns to Postpone Order:  Does patient have any of the following medical concerns that should postpone/delay colonoscopy referral?  No medical conditions affecting colonoscopy referral.    Final Referral Details:  Based on patient's medical history patient is appropriate for referral order with moderate sedation. If patient's BMI > 50 do not schedule in ASC.  "

## 2024-11-16 ENCOUNTER — PATIENT OUTREACH (OUTPATIENT)
Dept: CARE COORDINATION | Facility: CLINIC | Age: 69
End: 2024-11-16
Payer: MEDICARE

## 2025-01-09 DIAGNOSIS — E11.9 TYPE 2 DIABETES MELLITUS WITHOUT COMPLICATION, WITHOUT LONG-TERM CURRENT USE OF INSULIN (H): ICD-10-CM

## 2025-01-12 ENCOUNTER — HEALTH MAINTENANCE LETTER (OUTPATIENT)
Age: 70
End: 2025-01-12

## 2025-02-05 ENCOUNTER — TELEPHONE (OUTPATIENT)
Dept: INTERNAL MEDICINE | Facility: CLINIC | Age: 70
End: 2025-02-05
Payer: MEDICARE

## 2025-02-05 NOTE — TELEPHONE ENCOUNTER
Dr Bangura asked me to reach out to him and schedule a physical on 3/5/25.  Left message for patient to call back

## 2025-02-24 ENCOUNTER — TRANSFERRED RECORDS (OUTPATIENT)
Dept: HEALTH INFORMATION MANAGEMENT | Facility: CLINIC | Age: 70
End: 2025-02-24
Payer: MEDICARE

## 2025-02-28 SDOH — HEALTH STABILITY: PHYSICAL HEALTH: ON AVERAGE, HOW MANY DAYS PER WEEK DO YOU ENGAGE IN MODERATE TO STRENUOUS EXERCISE (LIKE A BRISK WALK)?: 2 DAYS

## 2025-02-28 SDOH — HEALTH STABILITY: PHYSICAL HEALTH: ON AVERAGE, HOW MANY MINUTES DO YOU ENGAGE IN EXERCISE AT THIS LEVEL?: 30 MIN

## 2025-02-28 ASSESSMENT — SOCIAL DETERMINANTS OF HEALTH (SDOH): HOW OFTEN DO YOU GET TOGETHER WITH FRIENDS OR RELATIVES?: ONCE A WEEK

## 2025-03-05 ENCOUNTER — OFFICE VISIT (OUTPATIENT)
Dept: INTERNAL MEDICINE | Facility: CLINIC | Age: 70
End: 2025-03-05
Payer: MEDICARE

## 2025-03-05 ENCOUNTER — ANCILLARY PROCEDURE (OUTPATIENT)
Dept: GENERAL RADIOLOGY | Facility: CLINIC | Age: 70
End: 2025-03-05
Attending: INTERNAL MEDICINE
Payer: MEDICARE

## 2025-03-05 DIAGNOSIS — E11.9 TYPE 2 DIABETES MELLITUS WITHOUT COMPLICATION, WITHOUT LONG-TERM CURRENT USE OF INSULIN (H): ICD-10-CM

## 2025-03-05 DIAGNOSIS — M25.552 HIP PAIN, LEFT: ICD-10-CM

## 2025-03-05 DIAGNOSIS — I10 HTN, GOAL BELOW 140/90: ICD-10-CM

## 2025-03-05 DIAGNOSIS — I87.2 VENOUS (PERIPHERAL) INSUFFICIENCY: ICD-10-CM

## 2025-03-05 DIAGNOSIS — E78.5 HYPERLIPIDEMIA LDL GOAL <100: ICD-10-CM

## 2025-03-05 DIAGNOSIS — I25.83 CORONARY ARTERY DISEASE DUE TO LIPID RICH PLAQUE: Primary | ICD-10-CM

## 2025-03-05 DIAGNOSIS — I25.10 CORONARY ARTERY DISEASE DUE TO LIPID RICH PLAQUE: Primary | ICD-10-CM

## 2025-03-05 DIAGNOSIS — Z00.00 ROUTINE GENERAL MEDICAL EXAMINATION AT A HEALTH CARE FACILITY: Primary | ICD-10-CM

## 2025-03-05 PROBLEM — D12.6 ADENOMATOUS POLYP OF COLON, UNSPECIFIED PART OF COLON: Status: ACTIVE | Noted: 2022-11-01

## 2025-03-05 LAB
ALBUMIN SERPL BCG-MCNC: 4.5 G/DL (ref 3.5–5.2)
ALP SERPL-CCNC: 57 U/L (ref 40–150)
ALT SERPL W P-5'-P-CCNC: 25 U/L (ref 0–70)
ANION GAP SERPL CALCULATED.3IONS-SCNC: 11 MMOL/L (ref 7–15)
AST SERPL W P-5'-P-CCNC: 22 U/L (ref 0–45)
BILIRUB SERPL-MCNC: 0.5 MG/DL
BUN SERPL-MCNC: 12.3 MG/DL (ref 8–23)
CALCIUM SERPL-MCNC: 9.8 MG/DL (ref 8.8–10.4)
CHLORIDE SERPL-SCNC: 107 MMOL/L (ref 98–107)
CHOLEST SERPL-MCNC: 136 MG/DL
CK SERPL-CCNC: 46 U/L (ref 39–308)
CREAT SERPL-MCNC: 0.77 MG/DL (ref 0.67–1.17)
CREAT UR-MCNC: 152 MG/DL
EGFRCR SERPLBLD CKD-EPI 2021: >90 ML/MIN/1.73M2
ERYTHROCYTE [DISTWIDTH] IN BLOOD BY AUTOMATED COUNT: 12.6 % (ref 10–15)
EST. AVERAGE GLUCOSE BLD GHB EST-MCNC: 137 MG/DL
FASTING STATUS PATIENT QL REPORTED: YES
FASTING STATUS PATIENT QL REPORTED: YES
GLUCOSE SERPL-MCNC: 123 MG/DL (ref 70–99)
HBA1C MFR BLD: 6.4 % (ref 0–5.6)
HCO3 SERPL-SCNC: 23 MMOL/L (ref 22–29)
HCT VFR BLD AUTO: 44.8 % (ref 40–53)
HDLC SERPL-MCNC: 33 MG/DL
HGB BLD-MCNC: 16 G/DL (ref 13.3–17.7)
LDLC SERPL CALC-MCNC: 77 MG/DL
MCH RBC QN AUTO: 31.9 PG (ref 26.5–33)
MCHC RBC AUTO-ENTMCNC: 35.7 G/DL (ref 31.5–36.5)
MCV RBC AUTO: 89 FL (ref 78–100)
MICROALBUMIN UR-MCNC: 77.8 MG/L
MICROALBUMIN/CREAT UR: 51.18 MG/G CR (ref 0–17)
NONHDLC SERPL-MCNC: 103 MG/DL
PLATELET # BLD AUTO: 173 10E3/UL (ref 150–450)
POTASSIUM SERPL-SCNC: 4.1 MMOL/L (ref 3.4–5.3)
PROT SERPL-MCNC: 7.7 G/DL (ref 6.4–8.3)
RBC # BLD AUTO: 5.02 10E6/UL (ref 4.4–5.9)
SODIUM SERPL-SCNC: 141 MMOL/L (ref 135–145)
TRIGL SERPL-MCNC: 129 MG/DL
TSH SERPL DL<=0.005 MIU/L-ACNC: 1.44 UIU/ML (ref 0.3–4.2)
VIT B12 SERPL-MCNC: 414 PG/ML (ref 232–1245)
WBC # BLD AUTO: 5.5 10E3/UL (ref 4–11)

## 2025-03-05 PROCEDURE — G2211 COMPLEX E/M VISIT ADD ON: HCPCS | Performed by: INTERNAL MEDICINE

## 2025-03-05 PROCEDURE — G0439 PPPS, SUBSEQ VISIT: HCPCS | Mod: 4MD | Performed by: INTERNAL MEDICINE

## 2025-03-05 PROCEDURE — 82550 ASSAY OF CK (CPK): CPT | Performed by: INTERNAL MEDICINE

## 2025-03-05 PROCEDURE — 80053 COMPREHEN METABOLIC PANEL: CPT | Performed by: INTERNAL MEDICINE

## 2025-03-05 PROCEDURE — 82043 UR ALBUMIN QUANTITATIVE: CPT | Performed by: INTERNAL MEDICINE

## 2025-03-05 PROCEDURE — 73502 X-RAY EXAM HIP UNI 2-3 VIEWS: CPT | Mod: TC | Performed by: STUDENT IN AN ORGANIZED HEALTH CARE EDUCATION/TRAINING PROGRAM

## 2025-03-05 PROCEDURE — 82607 VITAMIN B-12: CPT | Performed by: INTERNAL MEDICINE

## 2025-03-05 PROCEDURE — 85027 COMPLETE CBC AUTOMATED: CPT | Performed by: INTERNAL MEDICINE

## 2025-03-05 PROCEDURE — 80061 LIPID PANEL: CPT | Performed by: INTERNAL MEDICINE

## 2025-03-05 PROCEDURE — 82570 ASSAY OF URINE CREATININE: CPT | Performed by: INTERNAL MEDICINE

## 2025-03-05 PROCEDURE — 36415 COLL VENOUS BLD VENIPUNCTURE: CPT | Performed by: INTERNAL MEDICINE

## 2025-03-05 PROCEDURE — 99214 OFFICE O/P EST MOD 30 MIN: CPT | Performed by: INTERNAL MEDICINE

## 2025-03-05 PROCEDURE — 84443 ASSAY THYROID STIM HORMONE: CPT | Performed by: INTERNAL MEDICINE

## 2025-03-05 PROCEDURE — 83036 HEMOGLOBIN GLYCOSYLATED A1C: CPT | Performed by: INTERNAL MEDICINE

## 2025-03-05 RX ORDER — SIMVASTATIN 20 MG
20 TABLET ORAL AT BEDTIME
Qty: 90 TABLET | Refills: 4 | Status: SHIPPED | OUTPATIENT
Start: 2025-03-05

## 2025-03-05 RX ORDER — LISINOPRIL 5 MG/1
5 TABLET ORAL DAILY
Qty: 90 TABLET | Refills: 4 | Status: SHIPPED | OUTPATIENT
Start: 2025-03-05

## 2025-03-05 NOTE — PATIENT INSTRUCTIONS
Plan:  Hip --  XRay today - [address]   2.  Labs today - [address]   3.  The following vaccines are recommended for you. Please check with your insurance about coverage.  Some insurances cover better if you have these vaccines at the pharmacy:  -- RSV   -- Pneumonia 20 or 21

## 2025-03-05 NOTE — PROGRESS NOTES
Dr Bangura's note    Patient's instructions / PLAN:                                                        Plan:  Hip --  XRay today - [address]   2.  Labs today - [address]   3.  The following vaccines are recommended for you. Please check with your insurance about coverage.  Some insurances cover better if you have these vaccines at the pharmacy:  -- RSV   -- Pneumonia 20 or 21          ASSESSMENT & PLAN:                                                      (Z00.00) Routine general medical examination at a health care facility  (primary encounter diagnosis)  Comment:   Plan: Hemoglobin A1c, CK total, Comprehensive         metabolic panel, Lipid panel reflex to direct         LDL Fasting, TSH with free T4 reflex, Albumin         Random Urine Quantitative with Creat Ratio, CBC        with platelets, Vitamin B12            (E11.9) Type 2 diabetes mellitus without complication, without long-term current use of insulin (H)  (I10) HTN, goal below 140/90  (E78.5) Hyperlipidemia LDL goal <100  Comment: Controlled    Plan: Hemoglobin A1c, CK total, Comprehensive         metabolic panel, Lipid panel reflex to direct         LDL Fasting, TSH with free T4 reflex, Albumin         Random Urine Quantitative with Creat Ratio, CBC        with platelets, Vitamin B12            (M25.552) Hip pain, left  Comment: with walking  Plan: XR Hip Left 2-3 Views                 Chief Complaint:                                                      Annual exam  Follow up chronic medical problems      SUBJECTIVE:                                                    History of present illness     We reviewed the chronic medical problems as above.   I reviewed the recent tests results in Epic     No acute complains       ROS:                                                      ROS: negative for fever, chills, cough, wheezes, chest pain, shortness of breath, vomiting, abdominal pain, leg swelling   PMHx: - reviewed  Past Medical History:    Diagnosis Date    Diabetes mellitus, type 2 (H)     Hyperlipidemia     Hypertension          PSHx: reviewed  Past Surgical History:   Procedure Laterality Date    HC TRANSURETHRAL ELEC-SURG PROSTATECTOM N/A 6/28/2018    Procedure: CYSTOSCOPY, TRANSURETHRAL RESECTION PROSTATE;  Surgeon: Rickey Carter MD;  Location: [facility];  Service: Urology    REMOVAL OF SPERM DUCT(S)      Description: Surgery Of Male Genitalia Vasectomy;  Recorded: 10/28/2009;  Comments: 2001.    TURP  2018        Soc Hx: No daily alcohol, no smoking  Social History     Socioeconomic History    Marital status:      Spouse name: Not on file    Number of children: Not on file    Years of education: Not on file    Highest education level: Not on file   Occupational History    Not on file   Tobacco Use    Smoking status: Never     Passive exposure: Never    Smokeless tobacco: Never   Vaping Use    Vaping status: Never Used   Substance and Sexual Activity    Alcohol use: Not Currently    Drug use: Never    Sexual activity: Not Currently   Other Topics Concern    Not on file   Social History Narrative    Not on file     Social Drivers of Health     Financial Resource Strain: Low Risk  (2/28/2025)    Financial Resource Strain     Within the past 12 months, have you or your family members you live with been unable to get utilities (heat, electricity) when it was really needed?: No   Food Insecurity: Low Risk  (2/28/2025)    Food Insecurity     Within the past 12 months, did you worry that your food would run out before you got money to buy more?: No     Within the past 12 months, did the food you bought just not last and you didn t have money to get more?: No   Transportation Needs: Low Risk  (2/28/2025)    Transportation Needs     Within the past 12 months, has lack of transportation kept you from medical appointments, getting your medicines, non-medical meetings or appointments, work, or from getting things that you need?: No    Physical Activity: Insufficiently Active (2/28/2025)    Exercise Vital Sign     Days of Exercise per Week: 2 days     Minutes of Exercise per Session: 30 min   Stress: No Stress Concern Present (2/28/2025)    [employer] [place] [employer] - Occupational Stress Questionnaire     Feeling of Stress : Not at all   Social Connections: Unknown (2/28/2025)    Social Connection and Isolation Panel [NHANES]     Frequency of Communication with Friends and Family: Not on file     Frequency of Social Gatherings with Friends and Family: Once a week     Attends Hindu Services: Not on file     Active Member of Clubs or Organizations: Not on file     Attends Club or Organization Meetings: Not on file     Marital Status: Not on file   Interpersonal Safety: Low Risk  (9/29/2023)    Interpersonal Safety     Do you feel physically and emotionally safe where you currently live?: Yes     Within the past 12 months, have you been hit, slapped, kicked or otherwise physically hurt by someone?: No     Within the past 12 months, have you been humiliated or emotionally abused in other ways by your partner or ex-partner?: No   Housing Stability: Low Risk  (2/28/2025)    Housing Stability     Do you have housing? : Yes     Are you worried about losing your housing?: No        Fam Hx: reviewed  Family History   Problem Relation Age of Onset    No Known Problems Mother     No Known Problems Father     No Known Problems Sister          Screening: reviewed    All: reviewed    Meds: reviewed  Current Outpatient Medications   Medication Sig Dispense Refill    lisinopril (ZESTRIL) 5 MG tablet Take 1 tablet (5 mg) by mouth daily 90 tablet 4    metFORMIN (GLUCOPHAGE) 1000 MG tablet TAKE 1 TABLET(1000 MG) BY MOUTH TWICE DAILY WITH MEALS 90 tablet 4    simvastatin (ZOCOR) 20 MG tablet Take 1 tablet (20 mg) by mouth at bedtime 90 tablet 4           OBJECTIVE:                                                    Physical Exam :    There were no  vitals taken for this visit.   ***  NAD, appears comfortable  Skin clear, no rashes  Neck: supple, no JVD,  no thyroidmegaly  Lymph nodes non palpable in the cervical, supraclavicular axillaries,   Chest: clear to auscultation with good respiratory effort  Cardiac: S1S2, RRR, no mgr appreciated  Abdomen: soft, not tender, not distended, audible bowel sound, no hepatosplenomegaly, no palpable masses, no abdominal bruits  Extremities: no cyanosis, clubbing or edema.   Neuro: A, Ox3, no focal signs.      Appropriate preventive services were discussed with this patient, including applicable screening as appropriate for nutrition, physical activity      Patient has been advised of split billing requirements and indicates understanding: Yes.  At the check in, at the      Fabiana Bangura MD  Internal Medicine     ##############################    Preventive Care Visit  [facility]  Fabiana Valencia MD, Internal Medicine  Mar 5, 2025  {Provider  Link to SmartSet :649177}        Anders Negron is a 70 year old, presenting for the following:  No chief complaint on file.      {ROOMER if patient is in their first year of Medicare a vision screen is required click here to document the Vison screen and then refresh the note to pull in results  :279203}    Via the Health Maintenance questionnaire, the patient has reported the following services have been completed -Eye Exam: [facility] eye clinic 2024-01-27-Colonscopy: [facility] 2025-02-24, this information has been sent to the abstraction team.    HPI           Advance Care Planning  Patient does not have a Health Care Directive:       2/28/2025   General Health   How would you rate your overall physical health? (!) FAIR   Feel stress (tense, anxious, or unable to sleep) Not at all         2/28/2025   Nutrition   Diet: Diabetic         2/28/2025   Exercise   Days per week of moderate/strenous exercise 2 days   Average minutes spent  exercising at this level 30 min   (!) EXERCISE CONCERN      2/28/2025   Social Factors   Frequency of gathering with friends or relatives Once a week   Worry food won't last until get money to buy more No   Food not last or not have enough money for food? No   Do you have housing? (Housing is defined as stable permanent housing and does not include staying ouside in a car, in a tent, in an abandoned building, in an overnight shelter, or couch-surfing.) Yes   Are you worried about losing your housing? No   Lack of transportation? No   Unable to get utilities (heat,electricity)? No         2/28/2025   Fall Risk   Fallen 2 or more times in the past year? No   Trouble with walking or balance? No          2/28/2025   Activities of Daily Living- Home Safety   Needs help with the following daily activites None of the above   Safety concerns in the home None of the above         2/28/2025   Dental   Dentist two times every year? Yes         2/28/2025   Hearing Screening   Hearing concerns? (!) TROUBLE UNDERSTANDING SOFT OR WHISPERED SPEECH.         2/28/2025   Driving Risk Screening   Patient/family members have concerns about driving No         2/28/2025   General Alertness/Fatigue Screening   Have you been more tired than usual lately? No         2/28/2025   Urinary Incontinence Screening   Bothered by leaking urine in past 6 months No            Today's PHQ-2 Score:       3/5/2025     7:59 AM   PHQ-2 ( 1999 Pfizer)   Q1: Little interest or pleasure in doing things 0   Q2: Feeling down, depressed or hopeless 0   PHQ-2 Score 0    Q1: Little interest or pleasure in doing things Not at all   Q2: Feeling down, depressed or hopeless Not at all   PHQ-2 Score 0       Patient-reported           2/28/2025   Substance Use   Alcohol more than 3/day or more than 7/wk No   Do you have a current opioid prescription? No   How severe/bad is pain from 1 to 10? 3/10   Do you use any other substances recreationally? No     Social History      Tobacco Use    Smoking status: Never     Passive exposure: Never    Smokeless tobacco: Never   Vaping Use    Vaping status: Never Used   Substance Use Topics    Alcohol use: Not Currently    Drug use: Never     {Provider  If there are gaps in the social history shown above, please follow the link to update and then refresh the note Link to Social and Substance History :245011}      2/28/2025   AAA Screening   Family history of Abdominal Aortic Aneurysm (AAA)? No   ASCVD Risk   The ASCVD Risk score (Alfred DK, et al., 2019) failed to calculate for the following reasons:    The systolic blood pressure is missing        {Provider  REQUIRED FOR AWV Use the storyboard to review patient history, after sections have been marked as reviewed, refresh note to capture documentation:800033}  {Provider   REQUIRED AWV use this link to review and update sexual activity history  after section has been marked as reviewed, refresh note to capture documentation:783139}  Reviewed and updated as needed this visit by Provider                      Current providers sharing in care for this patient include:  Patient Care Team:  Fabiana Valencia MD as PCP - General (Internal Medicine)  Fabiana Valencia MD as Assigned PCP  Jose Heller MD as MD (Cardiovascular Disease)    The following health maintenance items are reviewed in Epic and correct as of today:  Health Maintenance   Topic Date Due    RSV VACCINE (1 - Risk 60-74 years 1-dose series) Never done    MEDICARE ANNUAL WELLNESS VISIT  11/01/2023    DIABETIC FOOT EXAM  11/01/2023    ANNUAL REVIEW OF HM ORDERS  11/01/2023    EYE EXAM  09/14/2024    A1C  03/11/2025    COVID-19 Vaccine (5 - 2024-25 season) 03/20/2025    BMP  09/11/2025    LIPID  09/11/2025    MICROALBUMIN  09/11/2025    FALL RISK ASSESSMENT  03/05/2026    DTAP/TDAP/TD IMMUNIZATION (3 - Td or Tdap) 01/11/2028    ADVANCE CARE PLANNING  09/29/2028    COLORECTAL CANCER SCREENING   "02/24/2032    HEPATITIS C SCREENING  Completed    PHQ-2 (once per calendar year)  Completed    INFLUENZA VACCINE  Completed    Pneumococcal Vaccine: 50+ Years  Completed    ZOSTER IMMUNIZATION  Completed    HPV IMMUNIZATION  Aged Out    MENINGITIS IMMUNIZATION  Aged Out            Objective    Exam  There were no vitals taken for this visit.   Estimated body mass index is 31.02 kg/m  as calculated from the following:    Height as of 9/29/23: 1.626 m (5' 4\").    Weight as of 9/29/23: 82 kg (180 lb 11.2 oz).    Physical Exam    {Provider  The Mini-Cog is incomplete, use link to complete and refresh note Link to Mini-Cog :[id number]}       No data to display              {A Mini-Cog total score of 0-2 suggests the possibility of dementia, score of 3-5 suggests no dementia:946307}     MiniCog 5/5    Signed Electronically by: Fabiana Valencia MD  {Email feedback regarding this note to [email or web address]   :923075}  "

## 2025-03-06 ENCOUNTER — HOSPITAL ENCOUNTER (OUTPATIENT)
Dept: CARDIOLOGY | Facility: CLINIC | Age: 70
End: 2025-03-06
Attending: INTERNAL MEDICINE
Payer: MEDICARE

## 2025-03-06 DIAGNOSIS — I25.10 CORONARY ARTERY DISEASE DUE TO LIPID RICH PLAQUE: ICD-10-CM

## 2025-03-06 DIAGNOSIS — I25.83 CORONARY ARTERY DISEASE DUE TO LIPID RICH PLAQUE: ICD-10-CM

## 2025-03-06 LAB — LVEF ECHO: NORMAL

## 2025-03-06 PROCEDURE — 255N000002 HC RX 255 OP 636: Performed by: INTERNAL MEDICINE

## 2025-03-06 PROCEDURE — 999N000208 ECHOCARDIOGRAM COMPLETE

## 2025-03-06 RX ADMIN — HUMAN ALBUMIN MICROSPHERES AND PERFLUTREN 4 ML: 10; .22 INJECTION, SOLUTION INTRAVENOUS at 14:37

## 2025-03-11 ENCOUNTER — ALLIED HEALTH/NURSE VISIT (OUTPATIENT)
Dept: VASCULAR SURGERY | Facility: CLINIC | Age: 70
End: 2025-03-11
Payer: MEDICARE

## 2025-03-11 ENCOUNTER — OFFICE VISIT (OUTPATIENT)
Dept: VASCULAR SURGERY | Facility: CLINIC | Age: 70
End: 2025-03-11
Payer: MEDICARE

## 2025-03-11 DIAGNOSIS — E11.9 TYPE 2 DIABETES MELLITUS WITHOUT COMPLICATION, WITHOUT LONG-TERM CURRENT USE OF INSULIN (H): ICD-10-CM

## 2025-03-11 DIAGNOSIS — I83.812 VARICOSE VEINS OF LEG WITH PAIN, LEFT: Primary | ICD-10-CM

## 2025-03-11 DIAGNOSIS — I87.2 VENOUS (PERIPHERAL) INSUFFICIENCY: ICD-10-CM

## 2025-03-11 PROCEDURE — A6533 GC STOCKING THIGHLNGTH 18-30: HCPCS

## 2025-03-11 PROCEDURE — G2211 COMPLEX E/M VISIT ADD ON: HCPCS | Performed by: SURGERY

## 2025-03-11 PROCEDURE — 99203 OFFICE O/P NEW LOW 30 MIN: CPT | Performed by: SURGERY

## 2025-03-11 PROCEDURE — 99207 PR NO CHARGE NURSE ONLY: CPT

## 2025-03-11 RX ORDER — ASPIRIN 81 MG/1
81 TABLET ORAL DAILY
COMMUNITY

## 2025-03-11 NOTE — LETTER
3/11/2025      Jamil Trejo  [address]  [facility] [address]      Dear Colleague,    Thank you for referring your patient, Jamil Trejo, to the [facility] [place]. Please see a copy of my visit note below.    VEIN SOLUTIONS CONSULT    Impression:  1.  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    2.  Diabetes mellitus type 2.    3.  Hypertension.    4.  Hyperlipidemia    Plan:  I had a nice discussion with Dr. Trejo regarding his symptomatic left leg varicosities.  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  He has worn thigh-high compression stockings for years and remains symptomatic.  I will arrange for a left leg venous competency study through this office.  Ultimate treatment recommendations will be pending those results.          HPI:   Jamil Trejo is a 70-year-old gentleman with metabolic syndrome referred to our vein office by his PCP for evaluation of left leg varicosities with associated swelling.  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  He has noted prominent left leg varicosities for 5 years or more.  For the past year he has noted increasing pain with ambulation or prolonged standing.  He has noted increased left calf swelling.  His symptoms negatively impact his employment and his activities of daily living.  Family history is negative for venous disease.  No prior history of venous intervention.    CURRENT MEDICATIONS  Current Outpatient Medications   Medication Sig Dispense Refill     empagliflozin (JARDIANCE) 10 MG TABS tablet Take 1 tablet (10 mg) by mouth daily. 90 tablet 1     lisinopril (ZESTRIL)  5 MG tablet Take 1 tablet (5 mg) by mouth daily. 90 tablet 4     metFORMIN (GLUCOPHAGE) 1000 MG tablet Take 1 tablet (1,000 mg) by mouth 2 times daily (with meals). 90 tablet 4     simvastatin (ZOCOR) 20 MG tablet Take 1 tablet (20 mg) by mouth at bedtime. 90 tablet 4     No current facility-administered medications for this visit.         PAST MEDICAL HISTORY  Past Medical History:   Diagnosis Date     Diabetes mellitus, type 2 (H)      Hyperlipidemia      Hypertension          PAST SURGICAL HISTORY:  Past Surgical History:   Procedure Laterality Date     HC TRANSURETHRAL ELEC-SURG PROSTATECTOM N/A 6/28/2018    Procedure: CYSTOSCOPY, TRANSURETHRAL RESECTION PROSTATE;  Surgeon: Rickey Carter MD;  Location: [facility];  Service: Urology     REMOVAL OF SPERM DUCT(S)      Description: Surgery Of Male Genitalia Vasectomy;  Recorded: 10/28/2009;  Comments: 2001.     TURP  2018       ALLERGIES   No Known Allergies    FAMILY HISTORY  Family History   Problem Relation Age of Onset     No Known Problems Mother      No Known Problems Father      No Known Problems Sister        SOCIAL HISTORY  Social History     Tobacco Use     Smoking status: Never     Passive exposure: Never     Smokeless tobacco: Never   Vaping Use     Vaping status: Never Used   Substance Use Topics     Alcohol use: Not Currently     Drug use: Never       ROS:   Review of Systems   Cardiovascular:  Positive for leg swelling.   Musculoskeletal:  Positive for muscle weakness.   All other systems reviewed and are negative.        EXAM:  There were no vitals taken for this visit.  Physical Exam  Constitutional:       Appearance: Normal appearance.   HENT:      Head: Normocephalic and atraumatic.   Eyes:      Extraocular Movements: Extraocular movements intact.      Pupils: Pupils are equal, round, and reactive to light.   Cardiovascular:      Pulses:           Dorsalis pedis pulses are 1+ on the right side and 1+ on the left side.        Posterior  tibial pulses are 2+ on the right side and 2+ on the left side.      Comments: Moderate varicosities coursing down the medial aspect of the distal left thigh and medial left calf.  Musculoskeletal:         General: Normal range of motion.      Right lower leg: No edema.      Left lower leg: Edema present.   Skin:     General: Skin is warm and dry.   Neurological:      General: No focal deficit present.      Mental Status: He is alert and oriented to person, place, and time. Mental status is at baseline.   Psychiatric:         Mood and Affect: Mood normal.         Behavior: Behavior normal.         Thought Content: Thought content normal.         Judgment: Judgment normal.             Labs:  LIPID RESULTS:  Lab Results   Component Value Date    CHOL 136 03/05/2025    CHOL 147 10/19/2020    HDL 33 (L) 03/05/2025    HDL 35 (L) 10/19/2020    LDL 77 03/05/2025    LDL 76 10/19/2020    TRIG 129 03/05/2025    TRIG 180 (H) 10/19/2020       CBC RESULTS:  Lab Results   Component Value Date    WBC 5.5 03/05/2025    WBC 5.7 10/19/2020    RBC 5.02 03/05/2025    RBC 4.99 10/19/2020    HGB 16.0 03/05/2025    HGB 15.6 10/19/2020    HCT 44.8 03/05/2025    HCT 46.0 10/19/2020    MCV 89 03/05/2025    MCV 92 10/19/2020    MCH 31.9 03/05/2025    MCH 31.3 10/19/2020    MCHC 35.7 03/05/2025    MCHC 33.9 10/19/2020    RDW 12.6 03/05/2025    RDW 13.0 10/19/2020     03/05/2025     10/19/2020       BMP RESULTS:  Lab Results   Component Value Date     03/05/2025     10/19/2020    POTASSIUM 4.1 03/05/2025    POTASSIUM 4.5 11/01/2022    POTASSIUM 4.2 10/19/2020    CHLORIDE 107 03/05/2025    CHLORIDE 108 11/01/2022    CHLORIDE 108 10/19/2020    CO2 23 03/05/2025    CO2 25 11/01/2022    CO2 23 10/19/2020    ANIONGAP 11 03/05/2025    ANIONGAP 5 11/01/2022    ANIONGAP 6 10/19/2020     (H) 03/05/2025     (H) 11/01/2022     (H) 10/19/2020    BUN 12.3 03/05/2025    BUN 12 11/01/2022    BUN 12 10/19/2020     CR 0.77 03/05/2025    CR 0.79 10/19/2020    GFRESTIMATED >90 03/05/2025    GFRESTIMATED >90 10/19/2020    GFRESTBLACK >90 10/19/2020    KALEIGH 9.8 03/05/2025    KALEIGH 9.5 10/19/2020        A1C RESULTS:  Lab Results   Component Value Date    A1C 6.4 (H) 03/05/2025    A1C 6.4 (H) 10/19/2020         Imaging:  No results found for this or any previous visit (from the past 24 hours).    Michael Padron MD            VEIN CLINIC LEG DRAWING:              Again, thank you for allowing me to participate in the care of your patient.        Sincerely,        Michael Padron MD    Electronically signed

## 2025-03-11 NOTE — PROGRESS NOTES
VEIN SOLUTIONS CONSULT    Impression:  1.  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    2.  Diabetes mellitus type 2.    3.  Hypertension.    4.  Hyperlipidemia    Plan:  I had a nice discussion with Dr. Trejo regarding his symptomatic left leg varicosities.  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  He has worn thigh-high compression stockings for years and remains symptomatic.  I will arrange for a left leg venous competency study through this office.  Ultimate treatment recommendations will be pending those results.          HPI:   Jamil Trejo is a 70-year-old gentleman with metabolic syndrome referred to our vein office by his PCP for evaluation of left leg varicosities with associated swelling.  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  He has noted prominent left leg varicosities for 5 years or more.  For the past year he has noted increasing pain with ambulation or prolonged standing.  He has noted increased left calf swelling.  His symptoms negatively impact his employment and his activities of daily living.  Family history is negative for venous disease.  No prior history of venous intervention.    CURRENT MEDICATIONS  Current Outpatient Medications   Medication Sig Dispense Refill    empagliflozin (JARDIANCE) 10 MG TABS tablet Take 1 tablet (10 mg) by mouth daily. 90 tablet 1    lisinopril (ZESTRIL) 5 MG tablet Take 1 tablet (5 mg) by mouth daily. 90 tablet 4    metFORMIN (GLUCOPHAGE) 1000 MG tablet Take 1 tablet (1,000 mg) by mouth 2 times daily (with meals). 90 tablet 4    simvastatin (ZOCOR) 20 MG tablet Take 1 tablet (20 mg) by mouth at  bedtime. 90 tablet 4     No current facility-administered medications for this visit.         PAST MEDICAL HISTORY  Past Medical History:   Diagnosis Date    Diabetes mellitus, type 2 (H)     Hyperlipidemia     Hypertension          PAST SURGICAL HISTORY:  Past Surgical History:   Procedure Laterality Date    HC TRANSURETHRAL ELEC-SURG PROSTATECTOM N/A 6/28/2018    Procedure: CYSTOSCOPY, TRANSURETHRAL RESECTION PROSTATE;  Surgeon: Rickey Carter MD;  Location: [facility];  Service: Urology    REMOVAL OF SPERM DUCT(S)      Description: Surgery Of Male Genitalia Vasectomy;  Recorded: 10/28/2009;  Comments: 2001.    TURP  2018       ALLERGIES   No Known Allergies    FAMILY HISTORY  Family History   Problem Relation Age of Onset    No Known Problems Mother     No Known Problems Father     No Known Problems Sister        SOCIAL HISTORY  Social History     Tobacco Use    Smoking status: Never     Passive exposure: Never    Smokeless tobacco: Never   Vaping Use    Vaping status: Never Used   Substance Use Topics    Alcohol use: Not Currently    Drug use: Never       ROS:   Review of Systems   Cardiovascular:  Positive for leg swelling.   Musculoskeletal:  Positive for muscle weakness.   All other systems reviewed and are negative.        EXAM:  There were no vitals taken for this visit.  Physical Exam  Constitutional:       Appearance: Normal appearance.   HENT:      Head: Normocephalic and atraumatic.   Eyes:      Extraocular Movements: Extraocular movements intact.      Pupils: Pupils are equal, round, and reactive to light.   Cardiovascular:      Pulses:           Dorsalis pedis pulses are 1+ on the right side and 1+ on the left side.        Posterior tibial pulses are 2+ on the right side and 2+ on the left side.      Comments: Moderate varicosities coursing down the medial aspect of the distal left thigh and medial left calf.  Musculoskeletal:         General: Normal range of motion.      Right lower leg:  No edema.      Left lower leg: Edema present.   Skin:     General: Skin is warm and dry.   Neurological:      General: No focal deficit present.      Mental Status: He is alert and oriented to person, place, and time. Mental status is at baseline.   Psychiatric:         Mood and Affect: Mood normal.         Behavior: Behavior normal.         Thought Content: Thought content normal.         Judgment: Judgment normal.             Labs:  LIPID RESULTS:  Lab Results   Component Value Date    CHOL 136 03/05/2025    CHOL 147 10/19/2020    HDL 33 (L) 03/05/2025    HDL 35 (L) 10/19/2020    LDL 77 03/05/2025    LDL 76 10/19/2020    TRIG 129 03/05/2025    TRIG 180 (H) 10/19/2020       CBC RESULTS:  Lab Results   Component Value Date    WBC 5.5 03/05/2025    WBC 5.7 10/19/2020    RBC 5.02 03/05/2025    RBC 4.99 10/19/2020    HGB 16.0 03/05/2025    HGB 15.6 10/19/2020    HCT 44.8 03/05/2025    HCT 46.0 10/19/2020    MCV 89 03/05/2025    MCV 92 10/19/2020    MCH 31.9 03/05/2025    MCH 31.3 10/19/2020    MCHC 35.7 03/05/2025    MCHC 33.9 10/19/2020    RDW 12.6 03/05/2025    RDW 13.0 10/19/2020     03/05/2025     10/19/2020       BMP RESULTS:  Lab Results   Component Value Date     03/05/2025     10/19/2020    POTASSIUM 4.1 03/05/2025    POTASSIUM 4.5 11/01/2022    POTASSIUM 4.2 10/19/2020    CHLORIDE 107 03/05/2025    CHLORIDE 108 11/01/2022    CHLORIDE 108 10/19/2020    CO2 23 03/05/2025    CO2 25 11/01/2022    CO2 23 10/19/2020    ANIONGAP 11 03/05/2025    ANIONGAP 5 11/01/2022    ANIONGAP 6 10/19/2020     (H) 03/05/2025     (H) 11/01/2022     (H) 10/19/2020    BUN 12.3 03/05/2025    BUN 12 11/01/2022    BUN 12 10/19/2020    CR 0.77 03/05/2025    CR 0.79 10/19/2020    GFRESTIMATED >90 03/05/2025    GFRESTIMATED >90 10/19/2020    GFRESTBLACK >90 10/19/2020    KALEIGH 9.8 03/05/2025    KALEIGH 9.5 10/19/2020        A1C RESULTS:  Lab Results   Component Value Date    A1C 6.4 (H) 03/05/2025     A1C 6.4 (H) 10/19/2020         Imaging:  No results found for this or any previous visit (from the past 24 hours).    Michael Padron MD

## 2025-03-11 NOTE — PATIENT INSTRUCTIONS
Thigh High, medical grade, 20-30 mmHg strength, compression stockings are recommended (may be required if having a vein procedure or treatment)    Options for purchasing compression stockings:    Call your insurance company and ask if compression stockings are covered.  If they are covered, they usually fall under your Durable Medical Equipment (DME) coverage.  The DME codes if they ask are: Knee high , Thigh high , Panty .   Be sure to ask if you need a specific medical diagnosis for coverage, if your deductible needs to be met first, how many pairs are covered and how often you can get them.  If insurance covers them and you would like to order from [facility], or another medical supply company: contact the vein clinic and we will fax a prescription to your medical supply company of choice. They will bill your insurance and ship them to you. Please let us know your color choice (black or beige), and toe choice (open or closed toe) when contacting us.    2.  We sell regular sizes of Mediven Brand in our clinic (except specialty order or petite sizing). Ask us to check if we have your size. We cannot bill your insurance for these purchases.  Knee high are $65/pair  Thigh high are $90/pair.   If you would like to purchase from the clinic, let us know including your color choice (black or beige), and toe choice (open or closed toe). We will set them aside for you to  and pay for at your next clinic appointment or the day of your procedure.    3.  Online website ordering options:   [email or web address]  [email or web address]  Amazon.com has many options available.     Popular brands for Thigh High - Mediven Comfort, Sigvaris, Jobst

## 2025-03-11 NOTE — PROGRESS NOTES
Patient purchased 1 pair(s) of Black, Thigh High, Closed Toe, size 2 compression hose from the clinic today.     Informed patient all compression hose purchases are final.    Tiffanie House RN  [facility]  Vein Clinic

## 2025-03-15 ENCOUNTER — HEALTH MAINTENANCE LETTER (OUTPATIENT)
Age: 70
End: 2025-03-15

## 2025-03-25 ENCOUNTER — OFFICE VISIT (OUTPATIENT)
Dept: CARDIOLOGY | Facility: CLINIC | Age: 70
End: 2025-03-25
Payer: MEDICARE

## 2025-03-25 VITALS
WEIGHT: 182 LBS | OXYGEN SATURATION: 99 % | BODY MASS INDEX: 30.32 KG/M2 | HEART RATE: 62 BPM | SYSTOLIC BLOOD PRESSURE: 131 MMHG | HEIGHT: 65 IN | DIASTOLIC BLOOD PRESSURE: 78 MMHG

## 2025-03-25 DIAGNOSIS — I25.10 CORONARY ARTERY DISEASE DUE TO LIPID RICH PLAQUE: ICD-10-CM

## 2025-03-25 DIAGNOSIS — Z95.5 HISTORY OF CORONARY ARTERY STENT PLACEMENT: Primary | ICD-10-CM

## 2025-03-25 DIAGNOSIS — I25.83 CORONARY ARTERY DISEASE DUE TO LIPID RICH PLAQUE: ICD-10-CM

## 2025-03-25 DIAGNOSIS — E78.5 HYPERLIPIDEMIA LDL GOAL <100: ICD-10-CM

## 2025-03-25 PROCEDURE — 3078F DIAST BP <80 MM HG: CPT | Performed by: INTERNAL MEDICINE

## 2025-03-25 PROCEDURE — 93000 ELECTROCARDIOGRAM COMPLETE: CPT | Performed by: INTERNAL MEDICINE

## 2025-03-25 PROCEDURE — 3075F SYST BP GE 130 - 139MM HG: CPT | Performed by: INTERNAL MEDICINE

## 2025-03-25 PROCEDURE — 99204 OFFICE O/P NEW MOD 45 MIN: CPT | Performed by: INTERNAL MEDICINE

## 2025-03-25 RX ORDER — NITROGLYCERIN 0.4 MG/1
TABLET SUBLINGUAL
Qty: 20 TABLET | Refills: 3 | Status: SHIPPED | OUTPATIENT
Start: 2025-03-25

## 2025-03-25 RX ORDER — CLOPIDOGREL BISULFATE 75 MG/1
75 TABLET ORAL DAILY
Qty: 90 TABLET | Refills: 3 | Status: SHIPPED | OUTPATIENT
Start: 2025-03-25

## 2025-03-25 RX ORDER — SIMVASTATIN 40 MG
40 TABLET ORAL AT BEDTIME
Qty: 90 TABLET | Refills: 3 | Status: SHIPPED | OUTPATIENT
Start: 2025-03-25

## 2025-03-25 NOTE — PATIENT INSTRUCTIONS
March 25, 2025    Thank you for allowing our Cardiology team to participate in your care.     Please note the following changes to your heart treatment plan:     Medication changes:   - restart clopidogrel 75 mg daily when able, continue indefinitely  - stop aspirin 81 mg daily 6/1/2025    - increase simvastatin 20 mg at bedtime to 40 mg at bedtime    - nitroglycerin as needed     - monitor BPs at home, contact me if readings are consistently >130/80 mmHg    Tests to be done:  - non-fasting labs in 6 months    Follow up:  - Follow up in 1 year, or sooner as needed      For scheduling, please call [phone]      Please contact our team through [place] or our Nurse Team Voicemail service [phone] for questions or concerns.     General Clinic [phone]     If you are having a medical emergency, please call [phone].     Sincerely,    Jose Heller MD, FACC  Cardiology    [facility] [place]

## 2025-03-25 NOTE — LETTER
3/25/2025    Fabiana Valencia MD  [address] [facility] [address]    RE: Jamil Trejo       Dear Colleague,     I had the pleasure of seeing Jamil Trejo in the [facility].      Cardiology Clinic Consultation:    March 25, 2025   Patient Name: Jamil Trejo  Patient MRN: [id number]    Consult indication: CAD    HPI:    I had the opportunity to see patient Jamil Trejo in cardiology clinic for a consultation. Patient is followed by our colleague Fabiana Valencia MD with Primary Care.     As you know patient is a pleasant 70-year-old male with a past medical history significant for hypertension, dyslipidemia, type 2 diabetes, CAD status post PCI, who presents to establish care.    Patient is a retired general surgeon, still practices internal medicine.  I also care for his wife in clinic.    Patient has been followed closely by Dr. Valencia, blood pressures are well-controlled, lipids have also improved considerably with simvastatin.    Patient was in his usual state of health until last year when he was in [place], had experienced chest pressure/tightness while at rest and with exertion.  He was seen by a cardiologist there, reviewed records which the patient brings with him, they are auto translated from [place] and so there are some translation issues however it appears that TTE demonstrated normal LV function but basal posterior hypokinesis.  Coronary angiogram demonstrated LAD 80% stenosis, left circumflex, dominant, 80% stenosis.  He underwent PCI of the LAD with RIVKA 2.5/15 mm, RIVKA to left circumflex 3.5/33 mm.  Right radial approach.  No complications.    Since then patient reports that overall he feels well.  No recurrence of the chest tightness/squeezing symptoms that he had before.  BP today in clinic mildly elevated however at home blood pressures are in the 120 over 70 mmHg range.  ECG today in clinic demonstrates sinus rhythm with  first-degree AV block, similar to ECG from 2023.  Reviewed TTE personally demonstrating normal LV function, with borderline mild concentric LVH.  No significant valvular abnormalities.  Reviewed last lipids, LDL 77 3/5/2025.    He engages in regular aerobic exercise with a treadmill and walking.  Also is mindful about his diet.    Assessment and Plan/Recommendations:    # CAD s/p PCI with RIVKA to LAD and RIVKA to LCx (5/30/2024, [place], for exertional angina). Asymptomatic since PCI.   # HTN, BP well controlled at home  # HL, LDL above goal <70  # DM2  # First degree AVB, asymptomatic, stable since 2023    - Reviewed prior cardiac history in detail  - Patient is in stable cardiac health without symptoms concerning for angina or decompensated heart failure  - He is following up with Dr. Padron with Vascular surgery for varicose veins, and so is holding his clopidogrel.  Advised that he restart this as able  - After 1 year post PCI, discussed monotherapy with aspirin versus P2 Y12 inhibitor monotherapy, since he does note that he bleeds quite easily, would favor clopidogrel 75 mg daily monotherapy.  Patient in agreement, plan to stop aspirin 6/1/2025  - LDL above goal of less than 70, discussed that generally for higher risk patients guidelines would recommend a goal of less than 55, discussed risk/benefits, patient would like to continue with a goal of less than 70, will increase simvastatin to 40 mg nightly with repeat labs and ALT in about 6 months  - Advised to monitor blood pressures at home, goal BP less than 130/80 mmHg  - Sublingual nitroglycerin as needed, discussed interactions/precautions  - Encouraged continued healthy lifestyle habits including regular aerobic exercise, Mediterranean type diet, advised to incorporate light resistance training  - Follow-up in 1 year, anticipate repeat TTE in about 2 years      Thank you for allowing our team to participate in the care of Jamil Trejo.  Please do not  hesitate to call or page me with any questions or concerns.    Sincerely,     Jose Heller MD, [facility]  Cardiology  March 25, 2025  Voice recognition software utilized.       Past Medical History:     Patient Active Problem List   Diagnosis     Nephrolithiasis     Hyperlipidemia     Essential Hypertension     Benign Polyps Of The Large Intestine     Male Erectile Disorder     Benign prostatic hyperplasia with lower urinary tract symptoms, symptom details unspecified     Diabetes mellitus, type 2 (H)     Psychosexual dysfunction with inhibited sexual excitement     Adenomatous polyp of colon -- needs colonoscopy 2032     Hyperlipidemia LDL goal <100     HTN, goal below 140/90       Past Surgical History:   Past Surgical History:   Procedure Laterality Date     HC TRANSURETHRAL ELEC-SURG PROSTATECTOM N/A 6/28/2018    Procedure: CYSTOSCOPY, TRANSURETHRAL RESECTION PROSTATE;  Surgeon: Rickey Carter MD;  Location: [facility];  Service: Urology     REMOVAL OF SPERM DUCT(S)      Description: Surgery Of Male Genitalia Vasectomy;  Recorded: 10/28/2009;  Comments: 2001.     TURP  2018       Medications (outpatient):  Current Outpatient Medications   Medication Sig Dispense Refill     aspirin 81 MG EC tablet Take 81 mg by mouth daily.       lisinopril (ZESTRIL) 5 MG tablet Take 1 tablet (5 mg) by mouth daily. 90 tablet 4     metFORMIN (GLUCOPHAGE) 1000 MG tablet Take 1 tablet (1,000 mg) by mouth 2 times daily (with meals). 90 tablet 4     simvastatin (ZOCOR) 20 MG tablet Take 1 tablet (20 mg) by mouth at bedtime. 90 tablet 4     empagliflozin (JARDIANCE) 10 MG TABS tablet Take 1 tablet (10 mg) by mouth daily. (Patient not taking: Reported on 3/25/2025) 90 tablet 1       Allergies:  No Known Allergies    Social History:   History   Drug Use Unknown      History   Smoking Status     Never   Smokeless Tobacco     Never     Social History    Substance and Sexual Activity      Alcohol use: Not Currently    "    Family History:  Family History   Problem Relation Age of Onset     No Known Problems Mother      No Known Problems Father      No Known Problems Sister        Review of Systems:   A comprehensive 12 system review of systems was carried out.  Pertinent positives and negatives are noted above. Otherwise negative for contributory information.    Objective & Physical Exam:  /78   Pulse 62   Ht 1.651 m (5' 5\")   Wt 82.6 kg (182 lb)   SpO2 99%   BMI 30.29 kg/m    Wt Readings from Last 2 Encounters:   03/25/25 82.6 kg (182 lb)   09/29/23 82 kg (180 lb 11.2 oz)     Body mass index is 30.29 kg/m .   Body surface area is 1.95 meters squared.    Constitutional: appears stated age, in no apparent distress, appears to be well nourished  Pulmonary: clear to auscultation bilaterally, no wheezes, no rales, no increased work of breathing  Cardiovascular: JVP normal, regular rate, regular rhythm, normal S1 and S2, no S3, S4, no murmur appreciated, no lower extremity edema    Data reviewed:  Lab Results   Component Value Date    WBC 5.5 03/05/2025    WBC 5.7 10/19/2020    RBC 5.02 03/05/2025    RBC 4.99 10/19/2020    HGB 16.0 03/05/2025    HGB 15.6 10/19/2020    HCT 44.8 03/05/2025    HCT 46.0 10/19/2020    MCV 89 03/05/2025    MCV 92 10/19/2020    MCH 31.9 03/05/2025    MCH 31.3 10/19/2020    MCHC 35.7 03/05/2025    MCHC 33.9 10/19/2020    RDW 12.6 03/05/2025    RDW 13.0 10/19/2020     03/05/2025     10/19/2020     Sodium   Date Value Ref Range Status   03/05/2025 141 135 - 145 mmol/L Final   10/19/2020 137 133 - 144 mmol/L Final     Potassium   Date Value Ref Range Status   03/05/2025 4.1 3.4 - 5.3 mmol/L Final   11/01/2022 4.5 3.4 - 5.3 mmol/L Final   10/19/2020 4.2 3.4 - 5.3 mmol/L Final     Chloride   Date Value Ref Range Status   03/05/2025 107 98 - 107 mmol/L Final   11/01/2022 108 94 - 109 mmol/L Final   10/19/2020 108 94 - 109 mmol/L Final     Carbon Dioxide   Date Value Ref Range Status "   10/19/2020 23 20 - 32 mmol/L Final     Carbon Dioxide (CO2)   Date Value Ref Range Status   03/05/2025 23 22 - 29 mmol/L Final   11/01/2022 25 20 - 32 mmol/L Final     Anion Gap   Date Value Ref Range Status   03/05/2025 11 7 - 15 mmol/L Final   11/01/2022 5 3 - 14 mmol/L Final   10/19/2020 6 3 - 14 mmol/L Final     Glucose   Date Value Ref Range Status   03/05/2025 123 (H) 70 - 99 mg/dL Final   11/01/2022 174 (H) 70 - 99 mg/dL Final   10/19/2020 115 (H) 70 - 99 mg/dL Final     Comment:     Fasting specimen     Urea Nitrogen   Date Value Ref Range Status   03/05/2025 12.3 8.0 - 23.0 mg/dL Final   11/01/2022 12 7 - 30 mg/dL Final   10/19/2020 12 7 - 30 mg/dL Final     Creatinine   Date Value Ref Range Status   03/05/2025 0.77 0.67 - 1.17 mg/dL Final   10/19/2020 0.79 0.66 - 1.25 mg/dL Final     GFR Estimate   Date Value Ref Range Status   03/05/2025 >90 >60 mL/min/1.73m2 Final     Comment:     eGFR calculated using 2021 CKD-EPI equation.   10/19/2020 >90 >60 mL/min/[1.73_m2] Final     Comment:     Non  GFR Calc  Starting 12/18/2018, serum creatinine based estimated GFR (eGFR) will be   calculated using the Chronic Kidney Disease Epidemiology Collaboration   (CKD-EPI) equation.       Calcium   Date Value Ref Range Status   03/05/2025 9.8 8.8 - 10.4 mg/dL Final   10/19/2020 9.5 8.5 - 10.1 mg/dL Final     Bilirubin Total   Date Value Ref Range Status   03/05/2025 0.5 <=1.2 mg/dL Final   10/19/2020 0.7 0.2 - 1.3 mg/dL Final     Alkaline Phosphatase   Date Value Ref Range Status   03/05/2025 57 40 - 150 U/L Final   10/19/2020 56 40 - 150 U/L Final     ALT   Date Value Ref Range Status   03/05/2025 25 0 - 70 U/L Final   10/19/2020 43 0 - 70 U/L Final     AST   Date Value Ref Range Status   03/05/2025 22 0 - 45 U/L Final   10/19/2020 31 0 - 45 U/L Final     Recent Labs   Lab Test 03/05/25  0935 09/11/24  0811   CHOL 136 134   HDL 33* 31*   LDL 77 77   TRIG 129 129      Lab Results   Component Value Date     A1C 6.4 2025    A1C 6.6 2024    A1C 6.4 2024    A1C 6.6 2023    A1C 7.3 2023    A1C 6.4 10/19/2020        Recent Results (from the past 4320 hours)   Echocardiogram Complete   Result Value    LVEF  60-65%    [facility]    [id number]  [facility]  [id number]  [id number]^JACKIE^ARIANNA^RASHI     [facility]  Echocardiography Laboratory  [address] [place] [address]     Name: IVONNE MONTES  MRN: [id number]  : 1955  Study Date: 2025 02:09 PM  Age: 70 yrs  Gender: Male  Patient Location: [facility]  Reason For Study: Coronary artery disease due to lipid rich plaque, Coronary  arter  Ordering Physician: ARIANNA MEJIA  Referring Physician: ARIANNA MEJIA  Performed By: Arabella Henderson     BSA: 1.9 m2  Height: 64 in  Weight: 180 lb  HR: 72  ______________________________________________________________________________  Procedure  Echocardiogram with two-dimensional, color and spectral Doppler. Optison (NDC  #[id number]) given intravenously. Technically difficult study.Extremely  difficult acoustic windows despite the use of contrast for endcardial border  definition.  ______________________________________________________________________________  Interpretation Summary     Technically difficult study limited views obtained due to body habitus  TDS-Optison used (no complications)  The left ventricle is normal in structure, function and size.  The visual ejection fraction is 60-65%.  The right ventricle is normal in structure, function and size.  There is trace to mild pulmonic valvular regurgitation.  There is mild concentric left ventricular hypertrophy.  ______________________________________________________________________________  Left Ventricle  The left ventricle is normal in structure, function and size. There is mild  concentric left ventricular hypertrophy. The visual ejection fraction is 60-  65%. No regional  wall motion abnormalities noted.     Right Ventricle  The right ventricle is normal in structure, function and size.     Atria  Normal left atrial size. Right atrial size is normal. There is no color  Doppler evidence of an atrial shunt.     Mitral Valve  The mitral valve is normal in structure and function. There is trace mitral  regurgitation.     Tricuspid Valve  The tricuspid valve is normal in structure and function. There is trace  tricuspid regurgitation.     Aortic Valve  There is mild trileaflet aortic sclerosis.     Pulmonic Valve  The pulmonic valve is not well visualized. There is trace to mild pulmonic  valvular regurgitation.     Vessels  Normal size aorta. Normal size ascending aorta.     Pericardium  There is no pericardial effusion.     Rhythm  Sinus rhythm was noted.  ______________________________________________________________________________  MMode/2D Measurements & Calculations     IVSd: 1.2 cm  LVIDd: 4.5 cm  LVIDs: 2.1 cm  LVPWd: 1.2 cm  FS: 53.6 %  LV mass(C)d: 201.0 grams  LV mass(C)dI: 107.5 grams/m2  Ao root diam: 3.5 cm  asc Aorta Diam: 3.6 cm  LVOT diam: 2.0 cm  LVOT area: 3.2 cm2  Ao root diam index Ht(cm/m): 2.2  Ao root diam index BSA (cm/m2): 1.9  Asc Ao diam index BSA (cm/m2): 1.9  Asc Ao diam index Ht(cm/m): 2.2  LA Volume (BP): 42.5 ml     LA Volume Index (BP): 22.7 ml/m2  RV Base: 2.9 cm  RWT: 0.52  TAPSE: 2.3 cm     Doppler Measurements & Calculations  MV E max jae: 78.6 cm/sec  MV A max jae: 83.8 cm/sec  MV E/A: 0.94  MV max P.7 mmHg  MV mean P.3 mmHg  MV V2 VTI: 28.8 cm  MV dec slope: 293.7 cm/sec2  MV dec time: 0.27 sec  PA V2 max: 132.4 cm/sec  PA max P.0 mmHg  PA acc time: 0.15 sec  PI end-d jae: 94.0 cm/sec  E/E' av.4  Lateral E/e': 7.4  Medial E/e': 11.5     RV S Jae: 14.3 cm/sec     ______________________________________________________________________________  Report approved by: Emil Sethi MD on 2025 03:12 PM                Thank you for  allowing me to participate in the care of your patient.      Sincerely,     Jose Heller MD     [facility]  cc:   Referred Self, MD  No address on file

## 2025-03-25 NOTE — PROGRESS NOTES
Cardiology Clinic Consultation:    March 25, 2025   Patient Name: Jamil Trejo  Patient MRN: [id number]    Consult indication: CAD    HPI:    I had the opportunity to see patient Jamil Trejo in cardiology clinic for a consultation. Patient is followed by our colleague Fabiana Valencia MD with Primary Care.     As you know patient is a pleasant 70-year-old male with a past medical history significant for hypertension, dyslipidemia, type 2 diabetes, CAD status post PCI, who presents to establish care.    Patient is a retired general surgeon, still practices internal medicine.  I also care for his wife in clinic.    Patient has been followed closely by Dr. Valencia, blood pressures are well-controlled, lipids have also improved considerably with simvastatin.    Patient was in his usual state of health until last year when he was in [place], had experienced chest pressure/tightness while at rest and with exertion.  He was seen by a cardiologist there, reviewed records which the patient brings with him, they are auto translated from Armenian and so there are some translation issues however it appears that TTE demonstrated normal LV function but basal posterior hypokinesis.  Coronary angiogram demonstrated LAD 80% stenosis, left circumflex, dominant, 80% stenosis.  He underwent PCI of the LAD with RIVKA 2.5/15 mm, RIVKA to left circumflex 3.5/33 mm.  Right radial approach.  No complications.    Since then patient reports that overall he feels well.  No recurrence of the chest tightness/squeezing symptoms that he had before.  BP today in clinic mildly elevated however at home blood pressures are in the 120 over 70 mmHg range.  ECG today in clinic demonstrates sinus rhythm with first-degree AV block, similar to ECG from 2023.  Reviewed TTE personally demonstrating normal LV function, with borderline mild concentric LVH.  No significant valvular abnormalities.  Reviewed last lipids, LDL 77  3/5/2025.    He engages in regular aerobic exercise with a treadmill and walking.  Also is mindful about his diet.    Assessment and Plan/Recommendations:    # CAD s/p PCI with RIVKA to LAD and RIVKA to LCx (5/30/2024, [place], for exertional angina). Asymptomatic since PCI.   # HTN, BP well controlled at home  # HL, LDL above goal <70  # DM2  # First degree AVB, asymptomatic, stable since 2023    - Reviewed prior cardiac history in detail  - Patient is in stable cardiac health without symptoms concerning for angina or decompensated heart failure  - He is following up with Dr. Padron with Vascular surgery for varicose veins, and so is holding his clopidogrel.  Advised that he restart this as able  - After 1 year post PCI, discussed monotherapy with aspirin versus P2 Y12 inhibitor monotherapy, since he does note that he bleeds quite easily, would favor clopidogrel 75 mg daily monotherapy.  Patient in agreement, plan to stop aspirin 6/1/2025  - LDL above goal of less than 70, discussed that generally for higher risk patients guidelines would recommend a goal of less than 55, discussed risk/benefits, patient would like to continue with a goal of less than 70, will increase simvastatin to 40 mg nightly with repeat labs and ALT in about 6 months  - Advised to monitor blood pressures at home, goal BP less than 130/80 mmHg  - Sublingual nitroglycerin as needed, discussed interactions/precautions  - Encouraged continued healthy lifestyle habits including regular aerobic exercise, Mediterranean type diet, advised to incorporate light resistance training  - Follow-up in 1 year, anticipate repeat TTE in about 2 years      Thank you for allowing our team to participate in the care of Jamil Trejo.  Please do not hesitate to call or page me with any questions or concerns.    Sincerely,     Jose Heller MD, [facility]  Cardiology  March 25, 2025  Voice recognition software utilized.       Past Medical History:      Patient Active Problem List   Diagnosis    Nephrolithiasis    Hyperlipidemia    Essential Hypertension    Benign Polyps Of The Large Intestine    Male Erectile Disorder    Benign prostatic hyperplasia with lower urinary tract symptoms, symptom details unspecified    Diabetes mellitus, type 2 (H)    Psychosexual dysfunction with inhibited sexual excitement    Adenomatous polyp of colon -- needs colonoscopy 2032    Hyperlipidemia LDL goal <100    HTN, goal below 140/90       Past Surgical History:   Past Surgical History:   Procedure Laterality Date    HC TRANSURETHRAL ELEC-SURG PROSTATECTOM N/A 6/28/2018    Procedure: CYSTOSCOPY, TRANSURETHRAL RESECTION PROSTATE;  Surgeon: Rickey Carter MD;  Location: [facility];  Service: Urology    REMOVAL OF SPERM DUCT(S)      Description: Surgery Of Male Genitalia Vasectomy;  Recorded: 10/28/2009;  Comments: 2001.    TURP  2018       Medications (outpatient):  Current Outpatient Medications   Medication Sig Dispense Refill    aspirin 81 MG EC tablet Take 81 mg by mouth daily.      lisinopril (ZESTRIL) 5 MG tablet Take 1 tablet (5 mg) by mouth daily. 90 tablet 4    metFORMIN (GLUCOPHAGE) 1000 MG tablet Take 1 tablet (1,000 mg) by mouth 2 times daily (with meals). 90 tablet 4    simvastatin (ZOCOR) 20 MG tablet Take 1 tablet (20 mg) by mouth at bedtime. 90 tablet 4    empagliflozin (JARDIANCE) 10 MG TABS tablet Take 1 tablet (10 mg) by mouth daily. (Patient not taking: Reported on 3/25/2025) 90 tablet 1       Allergies:  No Known Allergies    Social History:   History   Drug Use Unknown      History   Smoking Status    Never   Smokeless Tobacco    Never     Social History    Substance and Sexual Activity      Alcohol use: Not Currently       Family History:  Family History   Problem Relation Age of Onset    No Known Problems Mother     No Known Problems Father     No Known Problems Sister        Review of Systems:   A comprehensive 12 system review of systems was  "carried out.  Pertinent positives and negatives are noted above. Otherwise negative for contributory information.    Objective & Physical Exam:  /78   Pulse 62   Ht 1.651 m (5' 5\")   Wt 82.6 kg (182 lb)   SpO2 99%   BMI 30.29 kg/m    Wt Readings from Last 2 Encounters:   03/25/25 82.6 kg (182 lb)   09/29/23 82 kg (180 lb 11.2 oz)     Body mass index is 30.29 kg/m .   Body surface area is 1.95 meters squared.    Constitutional: appears stated age, in no apparent distress, appears to be well nourished  Pulmonary: clear to auscultation bilaterally, no wheezes, no rales, no increased work of breathing  Cardiovascular: JVP normal, regular rate, regular rhythm, normal S1 and S2, no S3, S4, no murmur appreciated, no lower extremity edema    Data reviewed:  Lab Results   Component Value Date    WBC 5.5 03/05/2025    WBC 5.7 10/19/2020    RBC 5.02 03/05/2025    RBC 4.99 10/19/2020    HGB 16.0 03/05/2025    HGB 15.6 10/19/2020    HCT 44.8 03/05/2025    HCT 46.0 10/19/2020    MCV 89 03/05/2025    MCV 92 10/19/2020    MCH 31.9 03/05/2025    MCH 31.3 10/19/2020    MCHC 35.7 03/05/2025    MCHC 33.9 10/19/2020    RDW 12.6 03/05/2025    RDW 13.0 10/19/2020     03/05/2025     10/19/2020     Sodium   Date Value Ref Range Status   03/05/2025 141 135 - 145 mmol/L Final   10/19/2020 137 133 - 144 mmol/L Final     Potassium   Date Value Ref Range Status   03/05/2025 4.1 3.4 - 5.3 mmol/L Final   11/01/2022 4.5 3.4 - 5.3 mmol/L Final   10/19/2020 4.2 3.4 - 5.3 mmol/L Final     Chloride   Date Value Ref Range Status   03/05/2025 107 98 - 107 mmol/L Final   11/01/2022 108 94 - 109 mmol/L Final   10/19/2020 108 94 - 109 mmol/L Final     Carbon Dioxide   Date Value Ref Range Status   10/19/2020 23 20 - 32 mmol/L Final     Carbon Dioxide (CO2)   Date Value Ref Range Status   03/05/2025 23 22 - 29 mmol/L Final   11/01/2022 25 20 - 32 mmol/L Final     Anion Gap   Date Value Ref Range Status   03/05/2025 11 7 - 15 mmol/L " Final   11/01/2022 5 3 - 14 mmol/L Final   10/19/2020 6 3 - 14 mmol/L Final     Glucose   Date Value Ref Range Status   03/05/2025 123 (H) 70 - 99 mg/dL Final   11/01/2022 174 (H) 70 - 99 mg/dL Final   10/19/2020 115 (H) 70 - 99 mg/dL Final     Comment:     Fasting specimen     Urea Nitrogen   Date Value Ref Range Status   03/05/2025 12.3 8.0 - 23.0 mg/dL Final   11/01/2022 12 7 - 30 mg/dL Final   10/19/2020 12 7 - 30 mg/dL Final     Creatinine   Date Value Ref Range Status   03/05/2025 0.77 0.67 - 1.17 mg/dL Final   10/19/2020 0.79 0.66 - 1.25 mg/dL Final     GFR Estimate   Date Value Ref Range Status   03/05/2025 >90 >60 mL/min/1.73m2 Final     Comment:     eGFR calculated using 2021 CKD-EPI equation.   10/19/2020 >90 >60 mL/min/[1.73_m2] Final     Comment:     Non  GFR Calc  Starting 12/18/2018, serum creatinine based estimated GFR (eGFR) will be   calculated using the Chronic Kidney Disease Epidemiology Collaboration   (CKD-EPI) equation.       Calcium   Date Value Ref Range Status   03/05/2025 9.8 8.8 - 10.4 mg/dL Final   10/19/2020 9.5 8.5 - 10.1 mg/dL Final     Bilirubin Total   Date Value Ref Range Status   03/05/2025 0.5 <=1.2 mg/dL Final   10/19/2020 0.7 0.2 - 1.3 mg/dL Final     Alkaline Phosphatase   Date Value Ref Range Status   03/05/2025 57 40 - 150 U/L Final   10/19/2020 56 40 - 150 U/L Final     ALT   Date Value Ref Range Status   03/05/2025 25 0 - 70 U/L Final   10/19/2020 43 0 - 70 U/L Final     AST   Date Value Ref Range Status   03/05/2025 22 0 - 45 U/L Final   10/19/2020 31 0 - 45 U/L Final     Recent Labs   Lab Test 03/05/25  0935 09/11/24  0811   CHOL 136 134   HDL 33* 31*   LDL 77 77   TRIG 129 129      Lab Results   Component Value Date    A1C 6.4 03/05/2025    A1C 6.6 09/11/2024    A1C 6.4 03/04/2024    A1C 6.6 09/29/2023    A1C 7.3 07/06/2023    A1C 6.4 10/19/2020        Recent Results (from the past 4320 hours)   Echocardiogram Complete   Result Value    LVEF  60-65%     [facility]    [id number]  Pending sale to [facility]  [id number]  [id number]^JACKIE^ARIANNA^RASHI     [facility]  Echocardiography Laboratory  [address] [place] [address]     Name: IVONNE MONTES  MRN: [id number]  : 1955  Study Date: 2025 02:09 PM  Age: 70 yrs  Gender: Male  Patient Location: [facility]  Reason For Study: Coronary artery disease due to lipid rich plaque, Coronary  arter  Ordering Physician: ARIANNA MEJIA  Referring Physician: ARIANNA MEJIA  Performed By: Arabella Henderson     BSA: 1.9 m2  Height: 64 in  Weight: 180 lb  HR: 72  ______________________________________________________________________________  Procedure  Echocardiogram with two-dimensional, color and spectral Doppler. Optison (NDC  #[id number]) given intravenously. Technically difficult study.Extremely  difficult acoustic windows despite the use of contrast for endcardial border  definition.  ______________________________________________________________________________  Interpretation Summary     Technically difficult study limited views obtained due to body habitus  TDS-Optison used (no complications)  The left ventricle is normal in structure, function and size.  The visual ejection fraction is 60-65%.  The right ventricle is normal in structure, function and size.  There is trace to mild pulmonic valvular regurgitation.  There is mild concentric left ventricular hypertrophy.  ______________________________________________________________________________  Left Ventricle  The left ventricle is normal in structure, function and size. There is mild  concentric left ventricular hypertrophy. The visual ejection fraction is 60-  65%. No regional wall motion abnormalities noted.     Right Ventricle  The right ventricle is normal in structure, function and size.     Atria  Normal left atrial size. Right atrial size is normal. There is no color  Doppler evidence of an atrial shunt.      Mitral Valve  The mitral valve is normal in structure and function. There is trace mitral  regurgitation.     Tricuspid Valve  The tricuspid valve is normal in structure and function. There is trace  tricuspid regurgitation.     Aortic Valve  There is mild trileaflet aortic sclerosis.     Pulmonic Valve  The pulmonic valve is not well visualized. There is trace to mild pulmonic  valvular regurgitation.     Vessels  Normal size aorta. Normal size ascending aorta.     Pericardium  There is no pericardial effusion.     Rhythm  Sinus rhythm was noted.  ______________________________________________________________________________  MMode/2D Measurements & Calculations     IVSd: 1.2 cm  LVIDd: 4.5 cm  LVIDs: 2.1 cm  LVPWd: 1.2 cm  FS: 53.6 %  LV mass(C)d: 201.0 grams  LV mass(C)dI: 107.5 grams/m2  Ao root diam: 3.5 cm  asc Aorta Diam: 3.6 cm  LVOT diam: 2.0 cm  LVOT area: 3.2 cm2  Ao root diam index Ht(cm/m): 2.2  Ao root diam index BSA (cm/m2): 1.9  Asc Ao diam index BSA (cm/m2): 1.9  Asc Ao diam index Ht(cm/m): 2.2  LA Volume (BP): 42.5 ml     LA Volume Index (BP): 22.7 ml/m2  RV Base: 2.9 cm  RWT: 0.52  TAPSE: 2.3 cm     Doppler Measurements & Calculations  MV E max jae: 78.6 cm/sec  MV A max jae: 83.8 cm/sec  MV E/A: 0.94  MV max P.7 mmHg  MV mean P.3 mmHg  MV V2 VTI: 28.8 cm  MV dec slope: 293.7 cm/sec2  MV dec time: 0.27 sec  PA V2 max: 132.4 cm/sec  PA max P.0 mmHg  PA acc time: 0.15 sec  PI end-d jae: 94.0 cm/sec  E/E' av.4  Lateral E/e': 7.4  Medial E/e': 11.5     RV S Jae: 14.3 cm/sec     ______________________________________________________________________________  Report approved by: Emil Sethi MD on 2025 03:12 PM

## 2025-03-26 ENCOUNTER — ANCILLARY PROCEDURE (OUTPATIENT)
Dept: ULTRASOUND IMAGING | Facility: CLINIC | Age: 70
End: 2025-03-26
Attending: SURGERY
Payer: MEDICARE

## 2025-03-26 ENCOUNTER — OFFICE VISIT (OUTPATIENT)
Dept: VASCULAR SURGERY | Facility: CLINIC | Age: 70
End: 2025-03-26
Attending: SURGERY
Payer: MEDICARE

## 2025-03-26 DIAGNOSIS — I83.812 VARICOSE VEINS OF LEG WITH PAIN, LEFT: ICD-10-CM

## 2025-03-26 DIAGNOSIS — I83.812 VARICOSE VEINS OF LEG WITH PAIN, LEFT: Primary | ICD-10-CM

## 2025-03-26 DIAGNOSIS — E11.9 TYPE 2 DIABETES MELLITUS WITHOUT COMPLICATION, WITHOUT LONG-TERM CURRENT USE OF INSULIN (H): ICD-10-CM

## 2025-03-26 DIAGNOSIS — Z95.5 HISTORY OF CORONARY ARTERY STENT PLACEMENT: ICD-10-CM

## 2025-03-26 DIAGNOSIS — E11.69 TYPE 2 DIABETES MELLITUS WITH OTHER SPECIFIED COMPLICATION, WITHOUT LONG-TERM CURRENT USE OF INSULIN (H): ICD-10-CM

## 2025-03-26 PROCEDURE — 99213 OFFICE O/P EST LOW 20 MIN: CPT | Performed by: SURGERY

## 2025-03-26 PROCEDURE — 93971 EXTREMITY STUDY: CPT | Mod: LT | Performed by: SURGERY

## 2025-03-26 NOTE — PATIENT INSTRUCTIONS
Jyotsna Castillo, Surgery Scheduler - [phone].    Procedure Plan: 1. Left leg VNUS closure GSV and 1 unit Phlebectomies (cosmetic)    1 unit of cosmetic phlebectomies = $789    Please call our office regarding the status of your insurance authorization if it has been more than 3 weeks and you have not heard from our surgery scheduler.         Pre-Procedure Instructions:                           VNUS Closure and Phlebectomies   You are having a Closure(s) - where one or more veins are closed and Phlebectomies - where one or more veins are removed.   Insurance  Precertification and/or referral authorization may be required by your insurance company.  We will call your insurance company to verify benefits for the medically necessary part of your procedure.    Your Current Medications and Allergies  To reduce bruising, please do not take aspirin-type medications (Motrin, Aleve, Ibuprofen, Advil, etc.) for three days before your procedure. You may take Tylenol if you need a pain reliever.  Are you on blood thinner medications? (Plavix, Coumadin, Eliquis, Xarelto) Please discuss this with your surgeon. You may resume taking your blood thinner medication after your procedure.  Are you sensitive to latex or adhesives used for fake fingernails? Please let us know!    Driving Escort and   Please arrange to have a trusted adult (18 years old or older) drive you to and from the clinic.  For your safety, we recommend you have a trusted adult to stay with you until the next morning.    Your Health  If you have a change in your health before the procedure, contact our office immediately. (For example: cold symptoms, cough, urinary tract infection, fever, flu symptoms.)  A pre-procedure physical is not required.    Note  It is sometimes necessary to adjust the procedure schedule due to emergencies. We greatly appreciate your flexibility and understanding in this matter.                  Check List: The Morning of Your  Procedure  ___1. Please do not put anything on your leg(s) or shave the day of your procedure.  ___2. You may take your normal medications the day of your procedure.  ___3. It is recommended you eat a light breakfast or lunch the day of your procedure.  ___4. Wear comfortable loose-fitting clothing and wide-fitting shoes (i.e. tennis shoes, slip-ons).  ___5. Please arrive at our clinic at the specified time given by the nurse.  ___6. You will sign an affirmation of informed consent.  ___7. Bring your pre-procedure sedation medication (lorazepam and clonidine) with you to the clinic.              One hour before your procedure, you will be instructed to take these medications. The lorazepam              (Ativan) lowers anxiety and sedates you; the clonidine makes the lorazepam more effective. Everyone's              body processes these medications differently. Therefore, reactions to these medications vary. Some              people stay awake and some people sleep through the whole procedure. You may not remember              everything about the procedure or the day. You do not want to make any big decisions for the rest of the              day.    The Day of Your Procedure:       VNUS Closure and Phlebectomies  In the Exam Room  A nurse will bring you back to an exam room with your family member or friend. This is when your informed consent will be signed, and you will take your pre-procedure medications.  You will be asked to remove everything from the waist down, including undergarments. You will then put on a hospital gown or shorts and blue booties.  Your surgeon will come in to answer any questions and carol any bulging varicose veins to be removed.  You will be taken to the restroom to empty your bladder before going into the procedure room.    In the Procedure Room  You will be escorted to the procedure room. You will lie on a procedure table covered with a sheet or blanket.  A nurse will put a blood  pressure cuff on your arm and a pulse/oxygen monitor on a finger. Your vital signs will be monitored every 15 minutes.  Your gown will be pulled up slightly and the groin exposed for a short period of time. The surgeon's assistant will clean your foot, leg, and groin with an antibacterial solution. We will get you covered up as quickly as possible!  Sterile towels and blue drapes will be used to cover you and the table. You will be asked to keep your hands under the blue drapes during the procedure.  The lights will be turned down. The table will be tipped so your head is higher than your feet. You may feel like you're going to slide off, but you won't.    The Procedure  The surgeon will visualize your veins with an ultrasound machine. He or she will then numb your skin and access the vein. A catheter is passed up the vein and positioned with ultrasound guidance. The table will then be tipped head down.  Once the catheter is in the correct position, medication will be injected to numb your leg. You will feel some needle sticks and may feel discomfort as the medication goes in. Once this is done, you should not experience significant discomfort. But if you do, please let us know and more numbing medication can be injected. As the catheter sends out heat, the vein closes off and the catheter is withdrawn.  For the phlebectomy part of the procedure, small incisions are made where the bulging varicose veins have been marked on your leg(s) and these veins will be removed using a small rocío hook instrument.    Post-Procedure  Once the procedure is done, your leg(s) will be washed with warm water and dried. Your leg(s) will be bandaged with large soft dressings and a large ACE bandage wrapped from toes to groin.   You will be offered something to drink and a light snack.  You will rest with your leg(s) elevated for approximately 30 minutes. Your friend or family member may join you.  For your safety, you will be taken  to your car in a wheelchair. If you are able to, it is good to keep your leg(s) elevated on the car ride home.    Post-Procedure Instructions:             VNUS Closure and Phlebectomies      Post-Op Day Zero - The Day of Your Procedure:0  1. Medication for Pain Control and Inflammation Control   - The numbing medication injected during your procedure will last for several hours. The pre-procedure                 tablets may make you very sleepy and you might not remember everything from the procedure or from                 the day. This will usually wear off by the next day.   - Ibuprofen:  If tolerated, take ibuprofen (e.g., Advil) to reduce inflammation whether or not you have                 pain. For three days, take two tablets (200 mg each) with every meal and at bedtime with a snack. If                 your pain is not controlled with ibuprofen, you may take prescription pain medication (such as Norco),                 if prescribed.   - You may resume taking any medications you were taking before your procedure.  2. Activity   - Rest with your leg(s) elevated above your heart. This will prevent from a lot of swelling and                 bleeding. You do not need to elevate your leg(s) while sleeping at night. You may go upstairs, sit up to                 eat, use the bathroom, and take several five-minute walks. Otherwise, keep your leg(s) elevated.                 Minimize the amount of time you are up on your feet to about 30 minutes at a time.  3. Bandages   - The incision sites will be covered with soft bandages and an ACE wrap. Keep your bandages on                 and dry until your post procedure nurse visit. The ACE should provide  snug  compression but should not cause pain or                 numbness in the toes. If you have significant discomfort or your toes become cold or numb, unwrap                 your ACE and rewrap with less tension starting at the toes wrapping upward.  4. Incisions   -  Bleeding: You may see some incision sites that are oozing through the bandages. This is not unusual      and can be managed with Rest, Ice, Compression and Elevation (RICE). Apply ice and firm pressure      directly to the site that is bleeding and rest with your leg(s) elevated above your heart for 20-30                 minutes.    Post-Op Day One:  1. Medication   - Ibuprofen: Continue the same as the Day of Your Procedure. If your pain is not controlled with                 ibuprofen, you may take prescription pain medication (such as Norco), if prescribed.  2. Activity   - We would like you to get up at least six times and walk around for short periods of time, unless it is      causing you pain. You should not be on your feet more than 90 minutes at a time. Elevate your leg      above your heart when you are not walking.  3. Bandages   - Your bandages must be kept on and dry until your post procedure nurse visit..  4. Driving   - You may resume driving when you can do so safely. Do not drive if you are taking narcotic pain      medication.  Post-Op Day Two:   1. Medication  - Ibuprofen: Continue the same as the Day of Your Procedure.  2.  Activity  - Walk as tolerated. Elevate as much as possible when not walking.  3. Bandages and Compression  - Remove ACE wrap and padding. Shower and put on your compression hose during waking hours only       for at least 5 days. (Your doctor may instruct you to keep your bandages on until your return     appointment; please follow your doctor's instructions.)  4. Incisions  - Your leg(s) will be bruised; there may be swelling, hard knots under the skin and possibly some     numbness. These will likely resolve over time. If you see  hair-like  strings coming out of your     incisions, do not pull them (this will only cause pain/discomfort). We will trim them when you come     back for your follow-up appointment.  5. Call Us If:   - You see any areas on your leg that are red  and angry in appearance.   - You notice any drainage that is milky or cloudy in appearance or that has a foul odor.   - You run a temperature of 100.5 or greater.    Post-Op Day Three:  You will have a follow up appointment 2-4 days post-procedure. At this appointment, you will have an ultrasound and we will check your incisions.    ________________________________________________________________________________________    The Two Weeks Following Your Procedure  1.  Skin Care   - Do not use any lotions, creams or powders on your incisions for 14 days or until the incisions have                 healed.   - Do not soak in a bathtub, hot tub or go swimming for 14 days or until your incisions have healed.  2.  Medications   - You may use ibuprofen or acetaminophen (e.g., Tylenol) as needed for pain or discomfort.  3.  Activity   - Do not lift over 25 pounds. After about two weeks you may resume exercise such as aerobics,                 running, tennis or weightlifting. Use your common sense and ease back into your exercise routine                 slowly.   - You may feel a cord-like tightness along the inside of your leg. Gentle stretching can be helpful.  4. Compression Hose   - Your doctor may instruct you to wear compression for longer than seven days; please follow your                 doctor's instructions. As a comfort measure, you may choose to wear compression for longer than                 required.  5.  Travel   - Do not fly in an airplane for 14 days after your procedure. If you have a long car trip planned within                 two to three weeks following your procedure, stop and walk for a few minutes every two hours.      Periodic ankle pumps during the ride may be helpful.    Six Week Appointment  - At your six-week appointment, you will see your surgeon for an exam and evaluation. This office visit     will be scheduled when you return for post-op day three return appointment.       Return to  Work  1.  If you work outside the home, you may return to work in a few days depending on the extent of your        procedure, how you tolerate it, and the type of work you perform.  2.  Paperwork: If your employer requires paperwork or you would like a letter written to your employer, please        let us know. We will complete disability type forms at no charge. Please allow five business days for forms        to be completed.

## 2025-03-26 NOTE — PROGRESS NOTES
March 26, 2025    Vein Procedure Recommendation    Spoke with patient in clinic.    Dr. Padron has recommended patient to have the following vein procedure(s):     1. Left leg VNUS closure GSV and 1 unit Phlebectomies (cosmetic)    1 unit cosmetic phlebectomies = $789      Patient Pre-op Questions:  Preferred Pharmacy: [facility]  Anticoagulant/ASA: Yes, Plavix, hold one week prior per TJG  Artificial Joint or Heart Valve:  No  Open ulcer:  No  Sedation nausea: No      Patient is recommended to wear Thigh High compression hose following his procedure. Discussed compression hose. Patient has thigh high compression hose.    Handed patient written procedure instructions to review on his own (see After Visit Summary).    Next steps:    Insurance Submission  Informed patient this process could take up to 14 business days, but once approved, the patient will be contacted by our surgery scheduler to schedule the above procedure. Gave patient our surgery scheduler's information.    Informed patient to call our office regarding the status of their insurance authorization if it has been more than 3 weeks and have not heard from our surgery scheduler.    Would like to schedule in September as he will be traveling the months prior.     Patient is in agreement with all of the above and has no further questions at this time.    Candy Elizabeth RN  [facility]  Vein Clinic

## 2025-03-26 NOTE — LETTER
3/26/2025      Jamil Trejo  [address]  [place] [address]      Dear Colleague,    Thank you for referring your patient, Jamil Trejo, to the [facility] [place]. Please see a copy of my visit note below.      March 26, 2025    Vein Procedure Recommendation    Spoke with patient in clinic.    Dr. Padron has recommended patient to have the following vein procedure(s):     1. Left leg VNUS closure GSV and 1 unit Phlebectomies (cosmetic)    1 unit cosmetic phlebectomies = $789      Patient Pre-op Questions:  Preferred Pharmacy: [facility]  Anticoagulant/ASA: Yes, Plavix, hold one week prior per TJG  Artificial Joint or Heart Valve:  No  Open ulcer:  No  Sedation nausea: No      Patient is recommended to wear Thigh High compression hose following his procedure. Discussed compression hose. Patient has thigh high compression hose.    Handed patient written procedure instructions to review on his own (see After Visit Summary).    Next steps:    Insurance Submission  Informed patient this process could take up to 14 business days, but once approved, the patient will be contacted by our surgery scheduler to schedule the above procedure. Gave patient our surgery scheduler's information.    Informed patient to call our office regarding the status of their insurance authorization if it has been more than 3 weeks and have not heard from our surgery scheduler.    Would like to schedule in September as he will be traveling the months prior.     Patient is in agreement with all of the above and has no further questions at this time.    Candy Elizabeth RN  [facility]  Vein Clinic    Jamil Trejo is a 70-year-old gentleman with metabolic syndrome referred to our vein office by his PCP for evaluation of left leg varicosities with associated swelling.  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  He has noted prominent left leg varicosities for 5 years or more.  For the past year he has noted increasing pain with ambulation or prolonged standing.  He has noted increased left calf swelling.  His symptoms negatively impact his employment and his activities of daily living.  Family history is negative for venous disease.  No prior history of venous intervention.    He has therefore failed conservative therapy and his symptoms negatively impact his employment and his daily activities.  He presents today for a left leg venous competency study and a discussion as to his treatment options.  There has been no other change in his health history.    Exam:  I performed a limited repeat exam today as he was just thoroughly examined on 3/11/2025.  The left lower extremity is notable for moderate varicosities coursing down the medial aspect of the distal left thigh and the entire medial calf.  Trace left ankle edema.  Easily palpable pedal pulses bilaterally.        Imaging:  Left leg venous competency study today shows no significant deep vein issues.  He has incompetence of the left greater saphenous vein from the saphenofemoral junction to the proximal calf.  That vein measures 7.1 mm in diameter at the junction and 5.4 mm in diameter at the knee.  It is a superficial structure in the distal thigh and at the knee.  It gives rise to multiple incompetent varicose branches the largest measuring 6.2 mm at the knee coursing medially.  There are a few incompetent perforators which at this time I do not consider to be clinically significant.    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  Dimensions as noted above.  He has failed conservative therapy and his symptoms negatively impact his employment and his activities of daily  living.    He is also status post coronary stenting x 2 in May 2024 for which he is on Plavix.  I am unable to find documentation of that procedure in the chart.    RECOMMENDATION:  I had a detailed conversation with Dr. Trejo reviewing all the above.  My treatment recommendation is radiofrequency ablation of the right greater saphenous vein taking care to avoid thermal injury in the distal thigh and at the knee.  He is also interested in elective left leg stab phlebectomies of prominent varicose branches.  I thoroughly reviewed the specifics of the above-noted procedures including potential complications and the anticipated postprocedural course.  He will soon be returning to [place] for several months.  He indicates a strong desire to pursue this procedure upon his return to [place] likely sometime in September.  If he is still on Plavix at that time I would hold it for 1 week preoperatively if cleared by cardiology.  He may continue aspirin uninterrupted.  All of his questions were answered and he verbalizes full understanding to the above and complete agreement with this management plan.  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    Total length of this encounter was 20 minutes with time spent reviewing studies, interviewing and examining the patient, answering questions, and coordinating a treatment plan.    Matti Padron MD         Again, thank you for allowing me to participate in the care of your patient.        Sincerely,        Michael Padron MD    Electronically signed

## 2025-03-29 NOTE — PROGRESS NOTES
Jamil Trejo is a 70-year-old gentleman with metabolic syndrome referred to our vein office by his PCP for evaluation of left leg varicosities with associated swelling.  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  He has noted prominent left leg varicosities for 5 years or more.  For the past year he has noted increasing pain with ambulation or prolonged standing.  He has noted increased left calf swelling.  His symptoms negatively impact his employment and his activities of daily living.  Family history is negative for venous disease.  No prior history of venous intervention.    He has therefore failed conservative therapy and his symptoms negatively impact his employment and his daily activities.  He presents today for a left leg venous competency study and a discussion as to his treatment options.  There has been no other change in his health history.    Exam:  I performed a limited repeat exam today as he was just thoroughly examined on 3/11/2025.  The left lower extremity is notable for moderate varicosities coursing down the medial aspect of the distal left thigh and the entire medial calf.  Trace left ankle edema.  Easily palpable pedal pulses bilaterally.        Imaging:  Left leg venous competency study today shows no significant deep vein issues.  He has incompetence of the left greater saphenous vein from the saphenofemoral junction to the proximal calf.  That vein measures 7.1 mm in diameter at the junction and 5.4 mm in diameter at the knee.  It is a superficial structure in the distal thigh and at the knee.  It gives rise to multiple incompetent varicose branches the largest measuring 6.2 mm at the knee coursing medially.  There are a few incompetent perforators which at this time I do not consider to be clinically  significant.    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  Dimensions as noted above.  He has failed conservative therapy and his symptoms negatively impact his employment and his activities of daily living.    He is also status post coronary stenting x 2 in May 2024 for which he is on Plavix.  I am unable to find documentation of that procedure in the chart.    RECOMMENDATION:  I had a detailed conversation with Dr. Trejo reviewing all the above.  My treatment recommendation is radiofrequency ablation of the right greater saphenous vein taking care to avoid thermal injury in the distal thigh and at the knee.  He is also interested in elective left leg stab phlebectomies of prominent varicose branches.  I thoroughly reviewed the specifics of the above-noted procedures including potential complications and the anticipated postprocedural course.  He will soon be returning to [place] for several months.  He indicates a strong desire to pursue this procedure upon his return to [place] likely sometime in September.  If he is still on Plavix at that time I would hold it for 1 week preoperatively if cleared by cardiology.  He may continue aspirin uninterrupted.  All of his questions were answered and he verbalizes full understanding to the above and complete agreement with this management plan.  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    Total length of this encounter was 20 minutes with time spent reviewing studies, interviewing and examining the patient, answering questions, and coordinating a treatment plan.    Matti Padron MD

## 2025-04-01 DIAGNOSIS — I83.812 VARICOSE VEINS OF LEG WITH PAIN, LEFT: Primary | ICD-10-CM

## 2025-05-04 DIAGNOSIS — E78.5 HYPERLIPIDEMIA LDL GOAL <100: ICD-10-CM

## 2025-05-05 RX ORDER — SIMVASTATIN 20 MG
20 TABLET ORAL AT BEDTIME
Qty: 90 TABLET | Refills: 4 | OUTPATIENT
Start: 2025-05-05

## 2025-05-07 DIAGNOSIS — I10 HTN, GOAL BELOW 140/90: ICD-10-CM

## 2025-05-07 DIAGNOSIS — E11.9 TYPE 2 DIABETES MELLITUS WITHOUT COMPLICATION, WITHOUT LONG-TERM CURRENT USE OF INSULIN (H): ICD-10-CM

## 2025-05-07 RX ORDER — LISINOPRIL 5 MG/1
5 TABLET ORAL DAILY
Qty: 90 TABLET | Refills: 1 | Status: SHIPPED | OUTPATIENT
Start: 2025-05-07

## 2025-05-08 ENCOUNTER — ALLIED HEALTH/NURSE VISIT (OUTPATIENT)
Dept: VASCULAR SURGERY | Facility: CLINIC | Age: 70
End: 2025-05-08
Payer: MEDICARE

## 2025-05-08 DIAGNOSIS — I83.812 VARICOSE VEINS OF LEG WITH PAIN, LEFT: Primary | ICD-10-CM

## 2025-05-08 NOTE — PROGRESS NOTES
Patient purchased 1 pair(s) of Thigh High, Closed Toe, size 2 compression hose from the clinic today.     Informed patient all compression hose purchases are final.    Tiffanie House RN  [facility]  Vein Clinic

## 2025-09-04 ENCOUNTER — MYC REFILL (OUTPATIENT)
Dept: INTERNAL MEDICINE | Facility: CLINIC | Age: 70
End: 2025-09-04
Payer: MEDICARE

## 2025-09-04 ENCOUNTER — MYC REFILL (OUTPATIENT)
Dept: CARDIOLOGY | Facility: CLINIC | Age: 70
End: 2025-09-04
Payer: MEDICARE

## 2025-09-04 DIAGNOSIS — E11.9 TYPE 2 DIABETES MELLITUS WITHOUT COMPLICATION, WITHOUT LONG-TERM CURRENT USE OF INSULIN (H): ICD-10-CM

## 2025-09-04 DIAGNOSIS — I10 HTN, GOAL BELOW 140/90: ICD-10-CM

## 2025-09-04 DIAGNOSIS — I25.83 CORONARY ARTERY DISEASE DUE TO LIPID RICH PLAQUE: ICD-10-CM

## 2025-09-04 DIAGNOSIS — E78.5 HYPERLIPIDEMIA LDL GOAL <100: ICD-10-CM

## 2025-09-04 DIAGNOSIS — I25.10 CORONARY ARTERY DISEASE DUE TO LIPID RICH PLAQUE: ICD-10-CM

## 2025-09-04 RX ORDER — CLOPIDOGREL BISULFATE 75 MG/1
75 TABLET ORAL DAILY
Qty: 90 TABLET | Refills: 0 | Status: SHIPPED | OUTPATIENT
Start: 2025-09-04

## 2025-09-04 RX ORDER — LISINOPRIL 5 MG/1
5 TABLET ORAL DAILY
Qty: 90 TABLET | Refills: 1 | OUTPATIENT
Start: 2025-09-04

## 2025-09-04 RX ORDER — SIMVASTATIN 40 MG
40 TABLET ORAL AT BEDTIME
Qty: 90 TABLET | Refills: 0 | Status: SHIPPED | OUTPATIENT
Start: 2025-09-04